# Patient Record
Sex: FEMALE | Race: WHITE | NOT HISPANIC OR LATINO | Employment: STUDENT | URBAN - METROPOLITAN AREA
[De-identification: names, ages, dates, MRNs, and addresses within clinical notes are randomized per-mention and may not be internally consistent; named-entity substitution may affect disease eponyms.]

---
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
## 2023-01-19 DIAGNOSIS — Z30.011 ENCOUNTER FOR INITIAL PRESCRIPTION OF CONTRACEPTIVE PILLS: ICD-10-CM

## 2023-01-19 RX ORDER — DROSPIRENONE AND ETHINYL ESTRADIOL 0.02-3(28)
1 KIT ORAL DAILY
Qty: 84 TABLET | Refills: 3 | Status: SHIPPED | OUTPATIENT
Start: 2023-01-19 | End: 2023-03-23 | Stop reason: ALTCHOICE

## 2023-02-11 ENCOUNTER — HEALTH MAINTENANCE LETTER (OUTPATIENT)
Age: 18
End: 2023-02-11

## 2023-03-23 ENCOUNTER — OFFICE VISIT (OUTPATIENT)
Dept: FAMILY MEDICINE | Facility: CLINIC | Age: 18
End: 2023-03-23

## 2023-03-23 VITALS
OXYGEN SATURATION: 98 % | HEART RATE: 86 BPM | SYSTOLIC BLOOD PRESSURE: 114 MMHG | DIASTOLIC BLOOD PRESSURE: 76 MMHG | WEIGHT: 170 LBS | BODY MASS INDEX: 26.04 KG/M2

## 2023-03-23 DIAGNOSIS — Z30.41 ENCOUNTER FOR SURVEILLANCE OF CONTRACEPTIVE PILLS: ICD-10-CM

## 2023-03-23 DIAGNOSIS — F90.9 ATTENTION DEFICIT HYPERACTIVITY DISORDER (ADHD), UNSPECIFIED ADHD TYPE: ICD-10-CM

## 2023-03-23 DIAGNOSIS — M54.50 ACUTE BILATERAL LOW BACK PAIN WITHOUT SCIATICA: Primary | ICD-10-CM

## 2023-03-23 DIAGNOSIS — Z79.899 CONTROLLED SUBSTANCE AGREEMENT SIGNED: ICD-10-CM

## 2023-03-23 PROCEDURE — 99214 OFFICE O/P EST MOD 30 MIN: CPT | Performed by: NURSE PRACTITIONER

## 2023-03-23 RX ORDER — DEXTROAMPHETAMINE SACCHARATE, AMPHETAMINE ASPARTATE MONOHYDRATE, DEXTROAMPHETAMINE SULFATE AND AMPHETAMINE SULFATE 7.5; 7.5; 7.5; 7.5 MG/1; MG/1; MG/1; MG/1
30 CAPSULE, EXTENDED RELEASE ORAL DAILY
Qty: 30 CAPSULE | Refills: 0 | Status: SHIPPED | OUTPATIENT
Start: 2023-05-24 | End: 2023-06-23

## 2023-03-23 RX ORDER — NORETHINDRONE ACETATE AND ETHINYL ESTRADIOL 1MG-20(21)
1 KIT ORAL DAILY
Qty: 84 TABLET | Refills: 4 | Status: SHIPPED | OUTPATIENT
Start: 2023-03-23 | End: 2024-06-06

## 2023-03-23 RX ORDER — DEXTROAMPHETAMINE SACCHARATE, AMPHETAMINE ASPARTATE MONOHYDRATE, DEXTROAMPHETAMINE SULFATE AND AMPHETAMINE SULFATE 7.5; 7.5; 7.5; 7.5 MG/1; MG/1; MG/1; MG/1
30 CAPSULE, EXTENDED RELEASE ORAL DAILY
Qty: 30 CAPSULE | Refills: 0 | Status: SHIPPED | OUTPATIENT
Start: 2023-04-23 | End: 2023-04-24

## 2023-03-23 RX ORDER — DEXTROAMPHETAMINE SACCHARATE, AMPHETAMINE ASPARTATE MONOHYDRATE, DEXTROAMPHETAMINE SULFATE AND AMPHETAMINE SULFATE 7.5; 7.5; 7.5; 7.5 MG/1; MG/1; MG/1; MG/1
30 CAPSULE, EXTENDED RELEASE ORAL DAILY
COMMUNITY
End: 2023-04-24

## 2023-03-23 RX ORDER — DEXTROAMPHETAMINE SACCHARATE, AMPHETAMINE ASPARTATE MONOHYDRATE, DEXTROAMPHETAMINE SULFATE AND AMPHETAMINE SULFATE 7.5; 7.5; 7.5; 7.5 MG/1; MG/1; MG/1; MG/1
30 CAPSULE, EXTENDED RELEASE ORAL DAILY
Qty: 30 CAPSULE | Refills: 0 | Status: SHIPPED | OUTPATIENT
Start: 2023-03-23 | End: 2023-04-22

## 2023-03-23 NOTE — LETTER
[facility]  03/23/23  Patient: Imelda Rodarte  YOB: 2005  Medical Record Number: [id number]                                                                                  Non-Opioid Controlled Substance Agreement    This is an agreement between you and your provider regarding safe and appropriate use of controlled substances prescribed by your care team. Controlled substances are?medicines that can cause physical and mental dependence (abuse).     There are strict laws about having and using these medicines. We here at [facility] are  committed to working with you in your efforts to get better. To support you in this work, we'll help you schedule regular office appointments for medicine refills. If we must cancel or change your appointment for any reason, we'll make sure you have enough medicine to last until your next appointment.     As a Provider, I will:     Listen carefully to your concerns while treating you with respect.     Recommend a treatment plan that I believe is in your best interest and may involve therapies other than medicine.      Talk with you often about the possible benefits and the risk of harm of any medicine that we prescribe for you.    Assess the safety of this medicine and check how well it works.      Provide a plan on how to taper (discontinue or go off) using this medicine if the decision is made to stop its use.      ::  As a Patient, I understand controlled substances:       Are prescribed by my care provider to help me function or work and manage my condition(s).?    Are strong medicines and can cause serious side effects.       Need to be taken exactly as prescribed.?Combining controlled substances with certain medicines or chemicals (such as illegal drugs, alcohol, sedatives, sleeping pills, and benzodiazepines) can be dangerous or even fatal.? If I stop taking my medicines suddenly, I may have severe withdrawal symptoms.     The risks,  benefits, and side effects of these medicine(s) were explained to me. I agree that:    1. I will take part in other treatments as advised by my care team. This may be psychiatry or counseling, physical therapy, behavioral therapy, group treatment or a referral to specialist.    2. I will keep all my appointments and understand this is part of the monitoring of controlled substances.?My care team may require an office visit for EVERY controlled substance refill. If I miss appointments or don t follow instructions, my care team may stop my medicine    3. I will take my medicines as prescribed. I will not change the dose or schedule unless my care team tells me to. There will be no refills if I run out early.      4. I may be asked to come to the clinic and complete a urine drug test or complete a pill count. If I don t give a urine sample or participate in a pill count, the care team may stop my medicine.    5. I will only receive controlled substance prescriptions from this clinic. If I am treated by another provider, I will tell them that I am taking controlled substances and that I have a treatment agreement with this provider. I will inform my [facility] care team within one business day if I am given a prescription for any controlled substance by another healthcare provider. My [facility] care team can contact other providers and pharmacists about my use of any medicines.    6. It is up to me to make sure that I don't run out of my medicines on weekends or holidays.?If my care team is willing to refill my prescription without a visit, I must request refills only during office hours. Refills may take up to 3 business days to process. I will use one pharmacy to fill all my controlled substance prescriptions. I will notify the clinic about any changes to my insurance or medicine availability.    7. I am responsible for my prescriptions. If the medicine/prescription is lost, stolen or destroyed, it will  not be replaced.?I also agree not to share controlled substance medicines with anyone.     8. I am aware I should not use any illegal or recreational drugs. I agree not to drink alcohol unless my care team says I can.     9. If I enroll in the [place] Medical Cannabis program, I will tell my care team before my next refill.    10. I will tell my care team right away if I become pregnant, have a new medical problem treated outside of my regular clinic, or have a change in my medicines.     11. I understand that this medicine can affect my thinking, judgment and reaction time.? Alcohol and drugs affect the brain and body, which can affect the safety of my driving. Being under the influence of alcohol or drugs can affect my decision-making, behaviors, personal safety and the safety of others. Driving while impaired (DWI) can occur if a person is driving, operating or in physical control of a car, motorcycle, boat, snowmobile, ATV, motorbike, off-road vehicle or any other motor vehicle ([place] Statute 169A.20). I understand the risk if I choose to drive or operate any vehicle or machinery.    I understand that if I do not follow any of the conditions above, my prescriptions or treatment may be stopped or changed.   I agree that my provider, clinic care team and pharmacy may work with any city, state or federal law enforcement agency that investigates the misuse, sale or other diversion of my controlled medicine. I will allow my provider to discuss my care with, or share a copy of, this agreement with any other treating provider, pharmacy or emergency room where I receive care.     I have read this agreement and have asked questions about anything I did not understand.    ________________________________________________________  Patient Signature - Imelda Rodarte     ___________________                   Date     ________________________________________________________  Provider Signature - DERREK Thomas CNP        ___________________                   Date     ________________________________________________________  Witness Signature (required if provider not present while patient signing)          ___________________                   Date

## 2023-03-23 NOTE — PROGRESS NOTES
Problem(s) Oriented visit        SUBJECTIVE:                                                    Imelda Rodarte is a 17 year old female who presents to clinic today for the following health issues :    Back pain - bilateral low back pain for past 1.5-2 months. No known trauma or injury. Radiates up to mid back to just below shoulders. Back feels tight. No Ibuprofen or Tylenol tried. Time helps.     Getting bad cramps with periods. Was doing well without cramps on birth control but cramps started getting back 4-5 months ago. First day of period bleeding heavy.     ADHD - Taking Adderall 30 mg extended release once daily. Feels like this is helping more than immediate release twice daily that she was using before. No appetite suppression or sleep problems. 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 Occasional alcohol use. No smoking or drug use.     Problem list, Medication list, Allergies, and Medical/Social/Surgical histories reviewed in [facility] and updated as appropriate.   Additional history: as documented    ROS:  7 point ROS completed and negative except noted above, including Gen, CV, Resp, , MS, Neuro, Psych    OBJECTIVE:                                                    /76   Pulse 86   Wt 77.1 kg (170 lb)   SpO2 98%   BMI 26.04 kg/m    Body mass index is 26.04 kg/m .   GENERAL: healthy, alert and no distress  RESP: lungs clear to auscultation - no rales, rhonchi or wheezes  CV: regular rates and rhythm, normal S1 S2, no S3 or S4 and no murmur, click or rub  MS: no paraspinal tenderness. Normal range of motion  SKIN: no suspicious lesions or rashes  NEURO: Normal strength and tone, sensory exam grossly normal and mentation intact  PSYCH: normal affect & mood     ASSESSMENT/PLAN:                                                      Imelda was seen today for refill request and back pain.    Diagnoses and all orders for this visit:    Acute bilateral low back pain without  sciatica  Discussed typical mechanical back pain, typical causes, and atypical back pain, including red flag symptoms.  Discussed conservative treatments inclduing physical therpy, stretching and strengthening, use of heat and/or ice, NSAIDs with food. Follow-up with new/worsening symptoms    Attention deficit hyperactivity disorder (ADHD), unspecified ADHD type  Controlled substance agreement signed  -     amphetamine-dextroamphetamine (ADDERALL XR) 30 MG 24 hr capsule; Take 1 capsule (30 mg) by mouth daily for 30 days  -     amphetamine-dextroamphetamine (ADDERALL XR) 30 MG 24 hr capsule; Take 1 capsule (30 mg) by mouth daily for 30 days  -     amphetamine-dextroamphetamine (ADDERALL XR) 30 MG 24 hr capsule; Take 1 capsule (30 mg) by mouth daily for 30 days  Doing well with current medication. Has not had difficulty getting this at her pharmacy. Follow-up visits every 6 months.  PDMP Review       Value Time User    State PDMP site checked  Yes 3/23/2023  3:45 PM Mari Xie APRN CNP        Encounter for surveillance of contraceptive pills  -     norethindrone-ethinyl estradiol (JUNEL FE 1/20) 1-20 MG-MCG tablet; Take 1 tablet by mouth daily  Switching type of oral contraceptive to see if this helps with cramping. Reviewed side effects of medications, alarm signs and symptoms, and when to seek further care.       See Patient Instructions  Patient Instructions   Finish current pack of birth control then start new one sent to pharmacy. Give it 2-3 months to see how this affects you.    Adderall sent to pharmacy. Request refill when 3 months runs out. Next appointment in 6 months.     Back pain - core exercises, stretching, heat, Ibuprofen or Tylenol as needed      DERREK Thomas CNP  [facility]  Family Practice  [facility]  [phone]    For any issues my office # is [phone]

## 2023-03-23 NOTE — PATIENT INSTRUCTIONS
Finish current pack of birth control then start new one sent to pharmacy. Give it 2-3 months to see how this affects you.    Adderall sent to pharmacy. Request refill when 3 months runs out. Next appointment in 6 months.     Back pain - core exercises, stretching, heat, Ibuprofen or Tylenol as needed

## 2023-04-18 ENCOUNTER — MYC MEDICAL ADVICE (OUTPATIENT)
Dept: FAMILY MEDICINE | Facility: CLINIC | Age: 18
End: 2023-04-18

## 2023-04-18 DIAGNOSIS — F90.9 ATTENTION DEFICIT HYPERACTIVITY DISORDER (ADHD), UNSPECIFIED ADHD TYPE: Primary | ICD-10-CM

## 2023-04-24 RX ORDER — DEXTROAMPHETAMINE SACCHARATE, AMPHETAMINE ASPARTATE MONOHYDRATE, DEXTROAMPHETAMINE SULFATE AND AMPHETAMINE SULFATE 7.5; 7.5; 7.5; 7.5 MG/1; MG/1; MG/1; MG/1
30 CAPSULE, EXTENDED RELEASE ORAL DAILY
Qty: 30 CAPSULE | Refills: 0 | Status: SHIPPED | OUTPATIENT
Start: 2023-04-24 | End: 2023-06-07

## 2023-04-24 NOTE — TELEPHONE ENCOUNTER
Patient is requesting a pharmacy transfer of her 4/23/23 Adderall XR 30mg #30 rx.   Patient reports [facility] in [place] does not have it in stock but did find [facility] [address] in [place] did have it in stock and asks for rx to be sent here.       Last related visit: 3/23/23 med check with Mari Xie CNP - 3 months of Adderall XR rx's were sent then.     Controlled Substance Agreement: 3/23/23    [place] :          plan: routed to Mari Xie CNP for review.  Jamee Ramires RN

## 2023-06-05 ENCOUNTER — OFFICE VISIT (OUTPATIENT)
Dept: URGENT CARE | Facility: URGENT CARE | Age: 18
End: 2023-06-05
Payer: COMMERCIAL

## 2023-06-05 VITALS
TEMPERATURE: 99.1 F | SYSTOLIC BLOOD PRESSURE: 132 MMHG | HEART RATE: 83 BPM | DIASTOLIC BLOOD PRESSURE: 79 MMHG | BODY MASS INDEX: 26.81 KG/M2 | OXYGEN SATURATION: 100 % | WEIGHT: 175 LBS

## 2023-06-05 DIAGNOSIS — H53.142: ICD-10-CM

## 2023-06-05 DIAGNOSIS — H57.89 REDNESS OF EYE, LEFT: ICD-10-CM

## 2023-06-05 DIAGNOSIS — H57.12 LEFT EYE PAIN: Primary | ICD-10-CM

## 2023-06-05 PROCEDURE — 99213 OFFICE O/P EST LOW 20 MIN: CPT | Performed by: PHYSICIAN ASSISTANT

## 2023-06-05 RX ORDER — OFLOXACIN 3 MG/ML
2 SOLUTION/ DROPS OPHTHALMIC
Qty: 5 ML | Refills: 0 | Status: SHIPPED | OUTPATIENT
Start: 2023-06-05 | End: 2023-06-12

## 2023-06-05 ASSESSMENT — PAIN SCALES - GENERAL: PAINLEVEL: SEVERE PAIN (6)

## 2023-06-05 NOTE — PROGRESS NOTES
Assessment & Plan     Left eye pain    Warm moist compresses  Motrin for inflammation  Start on ocuflox eye drops as prescribed  If eye pain and light sensitivity are still present tomorrow then advise being seen by ophthalmology tomorrow    - ofloxacin (OCUFLOX) 0.3 % ophthalmic solution; Place 2 drops Into the left eye every 2 hours (while awake) for 7 days  - Adult Eye  Referral; Future    Redness of eye, left    Start on eye drops  Monitor symptoms closely as discussed    - ofloxacin (OCUFLOX) 0.3 % ophthalmic solution; Place 2 drops Into the left eye every 2 hours (while awake) for 7 days  - Adult Eye  Referral; Future    Eyes sensitive to light, left    Start on eye drops  Follow up with Ophthalmology tomorrow  Referral placed on emergent basis due to eye pain, redness and light sensitivity  - ofloxacin (OCUFLOX) 0.3 % ophthalmic solution; Place 2 drops Into the left eye every 2 hours (while awake) for 7 days  - Adult Eye  Referral; Future    Review of external notes as documented elsewhere in note     PROCEDURE  Left eye flourescein stain exam in NEG  Patient tolerated procedure well    CONSULTATION/REFERRAL to Ophthalmology    No follow-ups on file.    Bryan Talamantes, [facility], PA-C  [facility] NNEKA Segura is a 17 year old, presenting for the following health issues:  Eye Problem (Left eye pain since this morning )         View : No data to display.              HPI   Review of Systems   Constitutional, HEENT, cardiovascular, pulmonary, gi and gu systems are negative, except as otherwise noted.      Objective    /79   Pulse 83   Temp 99.1  F (37.3  C) (Tympanic)   Wt 79.4 kg (175 lb)   SpO2 100%   BMI 26.81 kg/m    Body mass index is 26.81 kg/m .  Physical Exam   GENERAL: healthy, alert and no distress  EYES: PERRL, EOMI and conjunctiva/corneas- conjunctival injection OS  HENT: ear canals and TM's normal, nose and mouth without ulcers or  lesions  NECK: no adenopathy, no asymmetry, masses, or scars and thyroid normal to palpation  RESP: lungs clear to auscultation - no rales, rhonchi or wheezes  CV: regular rate and rhythm, normal S1 S2, no S3 or S4, no murmur, click or rub, no peripheral edema and peripheral pulses strong  MS: no gross musculoskeletal defects noted, no edema  SKIN: no suspicious lesions or rashes  NEURO: Normal strength and tone, mentation intact and speech normal  PSYCH: mentation appears normal, affect normal/bright

## 2023-06-07 ENCOUNTER — MYC REFILL (OUTPATIENT)
Dept: FAMILY MEDICINE | Facility: CLINIC | Age: 18
End: 2023-06-07

## 2023-06-07 DIAGNOSIS — F90.9 ATTENTION DEFICIT HYPERACTIVITY DISORDER (ADHD), UNSPECIFIED ADHD TYPE: ICD-10-CM

## 2023-06-08 RX ORDER — DEXTROAMPHETAMINE SACCHARATE, AMPHETAMINE ASPARTATE MONOHYDRATE, DEXTROAMPHETAMINE SULFATE AND AMPHETAMINE SULFATE 7.5; 7.5; 7.5; 7.5 MG/1; MG/1; MG/1; MG/1
30 CAPSULE, EXTENDED RELEASE ORAL DAILY
Qty: 30 CAPSULE | Refills: 0 | Status: SHIPPED | OUTPATIENT
Start: 2023-06-08 | End: 2023-06-13

## 2023-06-08 NOTE — CONFIDENTIAL NOTE
CONTROLLED SUBSTANCE REFILL REQUEST FOR ADDERALL  DOSE: 30 MG - # 30  SI CAP DAILY      LAST REFILL: 23    LAST APPT RELATED: 3/23/23  APPTS EVERY 6 MONTHS    NEXT APPT: NONE      CONTROLLED SUBSTANCE AGREEMENT: 3/23/23    URINE DRUG SCREEN: DUE        FILL HISTORY PER :          REFILL ROUTED TO SHREE SWAIN FOR REVIEW    Xiomara Galindo, [facility]

## 2023-06-13 RX ORDER — DEXTROAMPHETAMINE SACCHARATE, AMPHETAMINE ASPARTATE MONOHYDRATE, DEXTROAMPHETAMINE SULFATE AND AMPHETAMINE SULFATE 7.5; 7.5; 7.5; 7.5 MG/1; MG/1; MG/1; MG/1
30 CAPSULE, EXTENDED RELEASE ORAL DAILY
Qty: 30 CAPSULE | Refills: 0 | Status: SHIPPED | OUTPATIENT
Start: 2023-06-13 | End: 2023-08-10

## 2023-06-13 NOTE — TELEPHONE ENCOUNTER
Prescription unavailable at [facility]. Spoke with [facility] pharmacy who confirmed they have medication in stock. Prescription sent to on call provider Dr Sifuentes for review. Laisha Mullen

## 2023-08-10 ENCOUNTER — MYC REFILL (OUTPATIENT)
Dept: FAMILY MEDICINE | Facility: CLINIC | Age: 18
End: 2023-08-10

## 2023-08-10 DIAGNOSIS — F90.9 ATTENTION DEFICIT HYPERACTIVITY DISORDER (ADHD), UNSPECIFIED ADHD TYPE: ICD-10-CM

## 2023-08-11 RX ORDER — DEXTROAMPHETAMINE SACCHARATE, AMPHETAMINE ASPARTATE MONOHYDRATE, DEXTROAMPHETAMINE SULFATE AND AMPHETAMINE SULFATE 7.5; 7.5; 7.5; 7.5 MG/1; MG/1; MG/1; MG/1
30 CAPSULE, EXTENDED RELEASE ORAL DAILY
Qty: 30 CAPSULE | Refills: 0 | Status: SHIPPED | OUTPATIENT
Start: 2023-08-11 | End: 2023-10-20

## 2023-08-11 NOTE — TELEPHONE ENCOUNTER
Controlled Substance Refill Request for: Adderall XR 30mg si cap every day #30  Last refill: 23 picked up 6/15/23  Last clinic visit: 3/23/23 with Carolee Xie   Clinic visit frequency required: Q 6  months  Next appt: none- due to est care and med ck around 23  Controlled substance agreement on file: Yes:  Date 3/23/23.. tox screen done- 21    [place]  checked, fill history below. Refill routed to Senait Lance for review.     Kim Alberto CMA on 2023 at 3:14 PM

## 2023-08-11 NOTE — CONFIDENTIAL NOTE
CONTROLLED SUBSTANCE REFILL REQUEST FOR ADDERALL  DOSE: 30 MG - # 30  SI CAP DAILY      LAST REFILL: 23    LAST APPT RELATED: 3/23/23  *DUE NEXT MONTH FOR 6 MONTH MED CHECK    NEXT APPT: NONE      CONTROLLED SUBSTANCE AGREEMENT: 3/23/23    URINE DRUG SCREEN: DUE        FILL HISTORY PER :          REFILL ROUTED TO SHREE SWAIN FOR REVIEW    Xiomara Galindo, [facility]

## 2023-10-07 ENCOUNTER — MYC REFILL (OUTPATIENT)
Dept: FAMILY MEDICINE | Facility: CLINIC | Age: 18
End: 2023-10-07

## 2023-10-07 DIAGNOSIS — F90.9 ATTENTION DEFICIT HYPERACTIVITY DISORDER (ADHD), UNSPECIFIED ADHD TYPE: ICD-10-CM

## 2023-10-07 RX ORDER — DEXTROAMPHETAMINE SACCHARATE, AMPHETAMINE ASPARTATE MONOHYDRATE, DEXTROAMPHETAMINE SULFATE AND AMPHETAMINE SULFATE 7.5; 7.5; 7.5; 7.5 MG/1; MG/1; MG/1; MG/1
30 CAPSULE, EXTENDED RELEASE ORAL DAILY
Qty: 30 CAPSULE | Refills: 0 | Status: CANCELLED | OUTPATIENT
Start: 2023-10-07

## 2023-10-09 NOTE — TELEPHONE ENCOUNTER
Patient requesting refill of Adderall XR 30mg QD.   Last related visit: 3/23/23 with Mari Xie CNP   Due for med check 9/2023. Scheduled for 10/12/23 with Senait Lance NP but requests refill       [place] :        Messaged patient to schedule med check.   Patient messaged back that she has scheduled to see Senait Lance NP 10/12 AM.   Will let them discuss meds and refills then.  Jamee Ramires RN

## 2023-10-12 ENCOUNTER — OFFICE VISIT (OUTPATIENT)
Dept: FAMILY MEDICINE | Facility: CLINIC | Age: 18
End: 2023-10-12

## 2023-10-12 VITALS
BODY MASS INDEX: 26.35 KG/M2 | HEART RATE: 68 BPM | OXYGEN SATURATION: 98 % | DIASTOLIC BLOOD PRESSURE: 56 MMHG | SYSTOLIC BLOOD PRESSURE: 100 MMHG | WEIGHT: 172 LBS

## 2023-10-12 DIAGNOSIS — F41.8 SITUATIONAL ANXIETY: ICD-10-CM

## 2023-10-12 DIAGNOSIS — F90.9 ATTENTION DEFICIT HYPERACTIVITY DISORDER (ADHD), UNSPECIFIED ADHD TYPE: Primary | ICD-10-CM

## 2023-10-12 LAB
AMPHETAMINES (AMP) UR: NORMAL
BARBITURATES (BAR) UR: NEGATIVE
BENZODIAZEPINES (BZO) UR: NEGATIVE
BUPRENORPHINE (BUP) UR: NEGATIVE
CANNABINOIDS (THC) UR: NEGATIVE
COCAINE (COC) UR: NEGATIVE
MDMA UR: NEGATIVE
METHADONE (MTD) UR: NEGATIVE
METHAMPHETAMINE (METHA) UR: NEGATIVE
MORPH/OPIATES (MOP) UR: NEGATIVE
OXYCODONE (OXYCO) UR: NEGATIVE
PCP UR: NEGATIVE
SPECIMEN VALIDITY INTERNAL QC UR: NORMAL
SPECIMEN VALIDITY TEMPERATURE UR: NORMAL
SPECIMEN VALIDITY UR CREATININE: NORMAL MG/DL
SPECIMEN VALIDITY UR PH: 7
SPECIMEN VALIDITY UR SPECIFIC GRAVITY: 1.02

## 2023-10-12 PROCEDURE — 99213 OFFICE O/P EST LOW 20 MIN: CPT

## 2023-10-12 PROCEDURE — 80306 DRUG TEST PRSMV INSTRMNT: CPT

## 2023-10-12 RX ORDER — DEXTROAMPHETAMINE SACCHARATE, AMPHETAMINE ASPARTATE MONOHYDRATE, DEXTROAMPHETAMINE SULFATE AND AMPHETAMINE SULFATE 7.5; 7.5; 7.5; 7.5 MG/1; MG/1; MG/1; MG/1
30 CAPSULE, EXTENDED RELEASE ORAL DAILY
Qty: 30 CAPSULE | Refills: 0 | Status: SHIPPED | OUTPATIENT
Start: 2023-12-13 | End: 2024-01-12

## 2023-10-12 RX ORDER — DEXTROAMPHETAMINE SACCHARATE, AMPHETAMINE ASPARTATE MONOHYDRATE, DEXTROAMPHETAMINE SULFATE AND AMPHETAMINE SULFATE 7.5; 7.5; 7.5; 7.5 MG/1; MG/1; MG/1; MG/1
30 CAPSULE, EXTENDED RELEASE ORAL DAILY
Qty: 30 CAPSULE | Refills: 0 | Status: SHIPPED | OUTPATIENT
Start: 2023-10-12 | End: 2023-10-20

## 2023-10-12 RX ORDER — ADAPALENE 0.1 G/100G
CREAM TOPICAL
COMMUNITY
Start: 2023-06-27 | End: 2024-06-17

## 2023-10-12 RX ORDER — DEXTROAMPHETAMINE SACCHARATE, AMPHETAMINE ASPARTATE MONOHYDRATE, DEXTROAMPHETAMINE SULFATE AND AMPHETAMINE SULFATE 7.5; 7.5; 7.5; 7.5 MG/1; MG/1; MG/1; MG/1
30 CAPSULE, EXTENDED RELEASE ORAL DAILY
Qty: 30 CAPSULE | Refills: 0 | Status: SHIPPED | OUTPATIENT
Start: 2023-11-12 | End: 2023-10-20

## 2023-10-12 ASSESSMENT — ANXIETY QUESTIONNAIRES
5. BEING SO RESTLESS THAT IT IS HARD TO SIT STILL: NEARLY EVERY DAY
IF YOU CHECKED OFF ANY PROBLEMS ON THIS QUESTIONNAIRE, HOW DIFFICULT HAVE THESE PROBLEMS MADE IT FOR YOU TO DO YOUR WORK, TAKE CARE OF THINGS AT HOME, OR GET ALONG WITH OTHER PEOPLE: VERY DIFFICULT
3. WORRYING TOO MUCH ABOUT DIFFERENT THINGS: MORE THAN HALF THE DAYS
1. FEELING NERVOUS, ANXIOUS, OR ON EDGE: NEARLY EVERY DAY
7. FEELING AFRAID AS IF SOMETHING AWFUL MIGHT HAPPEN: NOT AT ALL
GAD7 TOTAL SCORE: 13
GAD7 TOTAL SCORE: 13
6. BECOMING EASILY ANNOYED OR IRRITABLE: MORE THAN HALF THE DAYS
2. NOT BEING ABLE TO STOP OR CONTROL WORRYING: MORE THAN HALF THE DAYS

## 2023-10-12 ASSESSMENT — PATIENT HEALTH QUESTIONNAIRE - PHQ9
5. POOR APPETITE OR OVEREATING: SEVERAL DAYS
SUM OF ALL RESPONSES TO PHQ QUESTIONS 1-9: 4

## 2023-10-12 NOTE — PATIENT INSTRUCTIONS
What to do when your medication needs to be refilled?     Call your pharmacy directly and request the medication refill.     Notes about prescriptions-  -Every time our providers send a prescription to the pharmacy, a new prescription number is created.     -If it is too soon for the medication(s) to be filled due to insurance, the pharmacy will STORE them on your profile and can be requested to fill at a later date    -If you have automatic refills, you may need to ask the STORED prescription to be filled when the refills run out    -Calling the pharmacy and speaking to the staff is the first step if there are issues using the automated systems    -You can check your current prescription orders and request refills using [employer]    -Most prescriptions  1 year from the date written (some medication orders  sooner like controlled substances)

## 2023-10-12 NOTE — PROGRESS NOTES
Assessment & Plan     Attention deficit hyperactivity disorder (ADHD), unspecified ADHD type  -stable on adderall, no new side effects  -recheck in 6 months - in person or video appropriate     Situational anxiety  -feels stable at this time, will reach out if worsening/persistent, most likely r/t timing of year in school       Prescription drug management         See Patient Instructions    Return in about 6 months (around 4/12/2024) for Follow up.    DERREK Ruiz CNP  [facility]    Mahsa Segura is a 18 year old, presenting for the following health issues:  Recheck Medication (Adderall )    HPI     Has been on adderall xr since 3rd and 4th grade. Eating well, denies insomnia, heart palpitations, dizziness or any other concerns     More stressed at work and is a  that has been stressful, hoping her anxiety levels will decrease after volleyball season. Not interested in medicinal or talk therapy at this time    Review of Systems   Constitutional, HEENT, cardiovascular, pulmonary, gi and gu systems are negative, except as otherwise noted.      Objective    /56   Pulse 68   Wt 78 kg (172 lb)   SpO2 98%   BMI 26.35 kg/m    Body mass index is 26.35 kg/m .  Physical Exam   GENERAL: healthy, alert and no distress  NECK: no adenopathy, no asymmetry, masses, or scars and thyroid normal to palpation  RESP: lungs clear to auscultation - no rales, rhonchi or wheezes  CV: regular rate and rhythm, normal S1 S2, no S3 or S4, no murmur, click or rub, no peripheral edema and peripheral pulses strong  MS: no gross musculoskeletal defects noted, no edema    Results for orders placed or performed in visit on 10/12/23 (from the past 24 hour(s))   UScreen Toxisure (RMG)   Result Value Ref Range    Cannabinoids (THC) UR Negative     Cocaine (MAG) UR Negative     Morphine (MOP) UR Negative     PCP UR Negative     MDMA UR Negative     Amphetamines (AMP) UR Presumptive Positive      Methamphetamine (METHA) UR Negative     Barbiturates (BAR) UR Negative     Benzodiazepines (BZO) UR Negative     Methadone (MTD) UR Negative     Oxycodone (OXYCO) UR Negative     Buprenorphine (BUP) UR Negative     Specimen Validity UR Creatinine 200 mg/dL mg/dL    Specimen Validity UR pH 7.0     Specimen Validity UR Specific Gravity 1.025     Specimen Validity Temperature UR PASS     Specimen Validity Internal QC UR PASS

## 2023-10-12 NOTE — LETTER
Former[facility]  10/12/23  Patient: Imelda Rodarte  YOB: 2005  Medical Record Number: [id number]                                                                                  Non-Opioid Controlled Substance Agreement    This is an agreement between you and your provider regarding safe and appropriate use of controlled substances prescribed by your care team. Controlled substances are?medicines that can cause physical and mental dependence (abuse).     There are strict laws about having and using these medicines. We here at [facility] are  committed to working with you in your efforts to get better. To support you in this work, we'll help you schedule regular office appointments for medicine refills. If we must cancel or change your appointment for any reason, we'll make sure you have enough medicine to last until your next appointment.     As a Provider, I will:   Listen carefully to your concerns while treating you with respect.   Recommend a treatment plan that I believe is in your best interest and may involve therapies other than medicine.    Talk with you often about the possible benefits and the risk of harm of any medicine that we prescribe for you.  Assess the safety of this medicine and check how well it works.    Provide a plan on how to taper (discontinue or go off) using this medicine if the decision is made to stop its use.      ::  As a Patient, I understand controlled substances:     Are prescribed by my care provider to help me function or work and manage my condition(s).?  Are strong medicines and can cause serious side effects.     Need to be taken exactly as prescribed.?Combining controlled substances with certain medicines or chemicals (such as illegal drugs, alcohol, sedatives, sleeping pills, and benzodiazepines) can be dangerous or even fatal.? If I stop taking my medicines suddenly, I may have severe withdrawal symptoms.     The risks, benefits, and side  effects of these medicine(s) were explained to me. I agree that:    I will take part in other treatments as advised by my care team. This may be psychiatry or counseling, physical therapy, behavioral therapy, group treatment or a referral to specialist.    I will keep all my appointments and understand this is part of the monitoring of controlled substances.?My care team may require an office visit for EVERY controlled substance refill. If I miss appointments or don t follow instructions, my care team may stop my medicine    I will take my medicines as prescribed. I will not change the dose or schedule unless my care team tells me to. There will be no refills if I run out early.      I may be asked to come to the clinic and complete a urine drug test or complete a pill count. If I don t give a urine sample or participate in a pill count, the care team may stop my medicine.    I will only receive controlled substance prescriptions from this clinic. If I am treated by another provider, I will tell them that I am taking controlled substances and that I have a treatment agreement with this provider. I will inform my [facility] care team within one business day if I am given a prescription for any controlled substance by another healthcare provider. My [facility] care team can contact other providers and pharmacists about my use of any medicines.    It is up to me to make sure that I don't run out of my medicines on weekends or holidays.?If my care team is willing to refill my prescription without a visit, I must request refills only during office hours. Refills may take up to 3 business days to process. I will use one pharmacy to fill all my controlled substance prescriptions. I will notify the clinic about any changes to my insurance or medicine availability.    I am responsible for my prescriptions. If the medicine/prescription is lost, stolen or destroyed, it will not be replaced.?I also agree not to  share controlled substance medicines with anyone.     I am aware I should not use any illegal or recreational drugs. I agree not to drink alcohol unless my care team says I can.     If I enroll in the [place] Medical Cannabis program, I will tell my care team before my next refill.    I will tell my care team right away if I become pregnant, have a new medical problem treated outside of my regular clinic, or have a change in my medicines.     I understand that this medicine can affect my thinking, judgment and reaction time.? Alcohol and drugs affect the brain and body, which can affect the safety of my driving. Being under the influence of alcohol or drugs can affect my decision-making, behaviors, personal safety and the safety of others. Driving while impaired (DWI) can occur if a person is driving, operating or in physical control of a car, motorcycle, boat, snowmobile, ATV, motorbike, off-road vehicle or any other motor vehicle ([place] Statute 169A.20). I understand the risk if I choose to drive or operate any vehicle or machinery.    I understand that if I do not follow any of the conditions above, my prescriptions or treatment may be stopped or changed.   I agree that my provider, clinic care team and pharmacy may work with any city, state or federal law enforcement agency that investigates the misuse, sale or other diversion of my controlled medicine. I will allow my provider to discuss my care with, or share a copy of, this agreement with any other treating provider, pharmacy or emergency room where I receive care.     I have read this agreement and have asked questions about anything I did not understand.    ________________________________________________________  Patient Signature - Imelda Rodarte     ___________________                   Date     ________________________________________________________  Provider Signature - DERREK Ruiz CNP       ___________________                   Date      ________________________________________________________  Witness Signature (required if provider not present while patient signing)          ___________________                   Date

## 2023-10-20 ENCOUNTER — HOSPITAL ENCOUNTER (EMERGENCY)
Facility: CLINIC | Age: 18
Discharge: HOME | End: 2023-10-20
Attending: STUDENT IN AN ORGANIZED HEALTH CARE EDUCATION/TRAINING PROGRAM | Admitting: STUDENT IN AN ORGANIZED HEALTH CARE EDUCATION/TRAINING PROGRAM
Payer: COMMERCIAL

## 2023-10-20 VITALS
SYSTOLIC BLOOD PRESSURE: 118 MMHG | DIASTOLIC BLOOD PRESSURE: 76 MMHG | OXYGEN SATURATION: 98 % | HEIGHT: 68 IN | TEMPERATURE: 97.9 F | WEIGHT: 173.8 LBS | BODY MASS INDEX: 26.34 KG/M2 | RESPIRATION RATE: 18 BRPM | HEART RATE: 97 BPM

## 2023-10-20 DIAGNOSIS — R55 SYNCOPE AND COLLAPSE: ICD-10-CM

## 2023-10-20 LAB
ALBUMIN SERPL BCG-MCNC: 4.1 G/DL (ref 3.5–5.2)
ALP SERPL-CCNC: 89 U/L (ref 35–104)
ALT SERPL W P-5'-P-CCNC: 13 U/L (ref 0–50)
ANION GAP SERPL CALCULATED.3IONS-SCNC: 10 MMOL/L (ref 7–15)
AST SERPL W P-5'-P-CCNC: 16 U/L (ref 0–45)
ATRIAL RATE - MUSE: 57 BPM
BASO+EOS+MONOS # BLD AUTO: NORMAL 10*3/UL
BASO+EOS+MONOS NFR BLD AUTO: NORMAL %
BASOPHILS # BLD AUTO: 0 10E3/UL (ref 0–0.2)
BASOPHILS NFR BLD AUTO: 0 %
BILIRUB SERPL-MCNC: 1.3 MG/DL
BUN SERPL-MCNC: 11.4 MG/DL (ref 6–20)
CALCIUM SERPL-MCNC: 9 MG/DL (ref 8.6–10)
CHLORIDE SERPL-SCNC: 102 MMOL/L (ref 98–107)
CREAT SERPL-MCNC: 0.94 MG/DL (ref 0.51–0.95)
DEPRECATED HCO3 PLAS-SCNC: 24 MMOL/L (ref 22–29)
DIASTOLIC BLOOD PRESSURE - MUSE: NORMAL MMHG
EGFRCR SERPLBLD CKD-EPI 2021: 89 ML/MIN/1.73M2
EOSINOPHIL # BLD AUTO: 0.2 10E3/UL (ref 0–0.7)
EOSINOPHIL NFR BLD AUTO: 2 %
ERYTHROCYTE [DISTWIDTH] IN BLOOD BY AUTOMATED COUNT: 12.1 % (ref 10–15)
GLUCOSE SERPL-MCNC: 104 MG/DL (ref 70–99)
HCG SERPL QL: NEGATIVE
HCT VFR BLD AUTO: 37.8 % (ref 35–47)
HGB BLD-MCNC: 12.3 G/DL (ref 11.7–15.7)
IMM GRANULOCYTES # BLD: 0 10E3/UL
IMM GRANULOCYTES NFR BLD: 0 %
INTERPRETATION ECG - MUSE: NORMAL
LYMPHOCYTES # BLD AUTO: 1.1 10E3/UL (ref 0.8–5.3)
LYMPHOCYTES NFR BLD AUTO: 14 %
MCH RBC QN AUTO: 29.9 PG (ref 26.5–33)
MCHC RBC AUTO-ENTMCNC: 32.5 G/DL (ref 31.5–36.5)
MCV RBC AUTO: 92 FL (ref 78–100)
MONOCYTES # BLD AUTO: 0.6 10E3/UL (ref 0–1.3)
MONOCYTES NFR BLD AUTO: 8 %
NEUTROPHILS # BLD AUTO: 5.8 10E3/UL (ref 1.6–8.3)
NEUTROPHILS NFR BLD AUTO: 76 %
NRBC # BLD AUTO: 0 10E3/UL
NRBC BLD AUTO-RTO: 0 /100
P AXIS - MUSE: 60 DEGREES
PLATELET # BLD AUTO: 225 10E3/UL (ref 150–450)
POTASSIUM SERPL-SCNC: 4 MMOL/L (ref 3.4–5.3)
PR INTERVAL - MUSE: 116 MS
PROT SERPL-MCNC: 7.3 G/DL (ref 6.4–8.3)
QRS DURATION - MUSE: 94 MS
QT - MUSE: 436 MS
QTC - MUSE: 424 MS
R AXIS - MUSE: 94 DEGREES
RBC # BLD AUTO: 4.12 10E6/UL (ref 3.8–5.2)
SODIUM SERPL-SCNC: 136 MMOL/L (ref 135–145)
SYSTOLIC BLOOD PRESSURE - MUSE: NORMAL MMHG
T AXIS - MUSE: 30 DEGREES
VENTRICULAR RATE- MUSE: 57 BPM
WBC # BLD AUTO: 7.6 10E3/UL (ref 4–11)

## 2023-10-20 PROCEDURE — 84703 CHORIONIC GONADOTROPIN ASSAY: CPT | Performed by: STUDENT IN AN ORGANIZED HEALTH CARE EDUCATION/TRAINING PROGRAM

## 2023-10-20 PROCEDURE — 99284 EMERGENCY DEPT VISIT MOD MDM: CPT | Mod: 25

## 2023-10-20 PROCEDURE — 96361 HYDRATE IV INFUSION ADD-ON: CPT

## 2023-10-20 PROCEDURE — 85025 COMPLETE CBC W/AUTO DIFF WBC: CPT | Performed by: STUDENT IN AN ORGANIZED HEALTH CARE EDUCATION/TRAINING PROGRAM

## 2023-10-20 PROCEDURE — 36415 COLL VENOUS BLD VENIPUNCTURE: CPT | Performed by: STUDENT IN AN ORGANIZED HEALTH CARE EDUCATION/TRAINING PROGRAM

## 2023-10-20 PROCEDURE — 80053 COMPREHEN METABOLIC PANEL: CPT | Performed by: STUDENT IN AN ORGANIZED HEALTH CARE EDUCATION/TRAINING PROGRAM

## 2023-10-20 PROCEDURE — 96374 THER/PROPH/DIAG INJ IV PUSH: CPT

## 2023-10-20 PROCEDURE — 93005 ELECTROCARDIOGRAM TRACING: CPT

## 2023-10-20 PROCEDURE — 250N000011 HC RX IP 250 OP 636: Mod: JZ | Performed by: STUDENT IN AN ORGANIZED HEALTH CARE EDUCATION/TRAINING PROGRAM

## 2023-10-20 PROCEDURE — 250N000013 HC RX MED GY IP 250 OP 250 PS 637: Performed by: STUDENT IN AN ORGANIZED HEALTH CARE EDUCATION/TRAINING PROGRAM

## 2023-10-20 PROCEDURE — 258N000003 HC RX IP 258 OP 636: Performed by: STUDENT IN AN ORGANIZED HEALTH CARE EDUCATION/TRAINING PROGRAM

## 2023-10-20 RX ORDER — KETOROLAC TROMETHAMINE 15 MG/ML
15 INJECTION, SOLUTION INTRAMUSCULAR; INTRAVENOUS ONCE
Status: COMPLETED | OUTPATIENT
Start: 2023-10-20 | End: 2023-10-20

## 2023-10-20 RX ORDER — ACETAMINOPHEN 500 MG
1000 TABLET ORAL ONCE
Status: COMPLETED | OUTPATIENT
Start: 2023-10-20 | End: 2023-10-20

## 2023-10-20 RX ADMIN — ACETAMINOPHEN 1000 MG: 500 TABLET, FILM COATED ORAL at 15:44

## 2023-10-20 RX ADMIN — SODIUM CHLORIDE 1000 ML: 9 INJECTION, SOLUTION INTRAVENOUS at 15:13

## 2023-10-20 RX ADMIN — KETOROLAC TROMETHAMINE 15 MG: 15 INJECTION, SOLUTION INTRAMUSCULAR; INTRAVENOUS at 15:44

## 2023-10-20 RX ADMIN — SODIUM CHLORIDE 1000 ML: 9 INJECTION, SOLUTION INTRAVENOUS at 13:35

## 2023-10-20 ASSESSMENT — ACTIVITIES OF DAILY LIVING (ADL)
ADLS_ACUITY_SCORE: 35

## 2023-10-20 NOTE — ED NOTES
[facility]  ED to EMPATH Checklist:      Goal for EMPATH: Anxiety management    Current Behavior: Calm and Cooperative    Safety Concerns: None    Legal Hold Status: Voluntary    Medically Cleared by ED provider: Yes, Vital signs stable, and IV removed    Patient Therapeutically Searched: N/A    Belongings: Remain with patient    Independent Ambulation at Baseline: Yes/No: Yes    Participates in Care/Conversation: Yes/No: Yes    Patient Informed about EMPATH: Yes/No: Yes    DEC: Not ordered    Patient Ready to be Transferred to EMPATH? Yes/No: Yes

## 2023-10-20 NOTE — ED TRIAGE NOTES
EMS report: at [facility] with mom. Clayton lightheaded/dizzy. Possible LOC (slumped in chair), pale, hypotensive. Initial BP was 60s/40s for EMS. Abd pain generalized, bilateral low back pain, nausea - no vomiting. Feeling ill since yesterday. PIV 20g R AC. EMS administered  mL. Last /39. Patient declined zofran. HR 50s-60s. Denies SOB.

## 2023-10-20 NOTE — ED NOTES
Road tested the patient. Patient was able to ambulated independently without any difficulties or complaints. MD notified.

## 2023-10-20 NOTE — ED PROVIDER NOTES
History     Chief Complaint:  Syncope, Abdominal Pain, and Hypotension       HPI   Imelda Rodarte is a 18 year old female otherwise healthy, brought in by EMS after syncopal episode.  Patient has been feeling unwell since yesterday which she thought was secondary to a cold.  She had sore throat, runny nose, nasal congestion and generalized body aches.  She did not drink much fluid yesterday.  This morning she had limited fluid but did not eat yet.  She was a previous bagels to try to get some food when she felt lightheaded and dizzy and then went out onto her knees and subsequently passed out.  She did not hit her head.  This was witnessed by friend.  No seizure activity.  She was found by EMS to be hypotensive and was transported here.  In the waiting room her blood pressure was in the 60s so she was promptly placed in room, IV fluids were started and labs drawn.  Patient states she is feeling improved.  No headache.  No chest pain or shortness of breath.  She has mild lower abdominal aching but she states is not particularly bothersome.  No focal motor or sensory deficit.  No numbness.  No urinary symptoms.  No difficulty stooling.  No vaginal bleeding.  Denies being pregnant.    No first-degree relative with sudden death at a young age.  Mother states her cousin had a thoracic dissection in his 30s, that was thought secondary to a bad valve.    Patient states she does have some low back pain bilaterally which has been there for a week.  Independent Historian:    none    Review of External Notes:  Office note October 12, 2023 for ADHD and situational anxiety.    Medications:    adapalene (DIFFERIN) 0.1 % external cream  amphetamine-dextroamphetamine (ADDERALL XR) 30 MG 24 hr capsule  [START ON 11/12/2023] amphetamine-dextroamphetamine (ADDERALL XR) 30 MG 24 hr capsule  [START ON 12/13/2023] amphetamine-dextroamphetamine (ADDERALL XR) 30 MG 24 hr capsule  amphetamine-dextroamphetamine (ADDERALL XR) 30 MG 24  hr capsule  norethindrone-ethinyl estradiol (JUNEL FE 1/20) 1-20 MG-MCG tablet        Past Medical History:    Past Medical History:   Diagnosis Date    ADHD        Past Surgical History:    Past Surgical History:   Procedure Laterality Date    CYST REMOVAL      under eye    TONSILLECTOMY            Physical Exam   Patient Vitals for the past 24 hrs:   BP Temp Temp src Pulse Resp SpO2   10/20/23 1545 110/45 -- -- 76 13 --   10/20/23 1530 106/51 -- -- 85 -- --   10/20/23 1515 102/55 -- -- 75 13 --   10/20/23 1500 98/62 -- -- 79 17 --   10/20/23 1450 (!) 80/43 -- -- 81 26 --   10/20/23 1445 (!) 62/41 -- -- 76 12 --   10/20/23 1430 (!) 106/37 -- -- 80 19 --   10/20/23 1415 (!) 81/37 -- -- 72 19 --   10/20/23 1413 -- -- -- 77 12 --   10/20/23 1409 108/84 -- -- -- -- --   10/20/23 1345 106/54 -- -- 95 -- --   10/20/23 1330 97/52 98  F (36.7  C) Oral 74 -- --   10/20/23 1320 (!) 68/49 -- -- -- -- --   10/20/23 1314 94/45 -- -- -- -- --   10/20/23 1311 (!) 52/26 -- -- 51 15 100 %        Physical Exam  GENERAL: Patient well-appearing  HEAD: Atraumatic.  EYES: Anicteric  NOSE: No active bleeding  MOUTH: Moist mucosa  THROAT: Patent airway.   NECK: No rigidity  CV: RRR, no murmurs rubs or gallops  PULM: CTAB with good aeration; no retractions, rales, rhonchi, or wheezing  ABD: Soft, nontender, nondistended, no guarding, no peritoneal signs   DERM: No rash. Skin warm and dry  EXTREMITY: Moving all extremities without difficulty. No calf tenderness or peripheral edema.  Extremities are warm and well-perfused.  VASCULAR: Symmetric pulses bilaterally = 1+ PT pulses bilaterally.  NEURO: A,Ox3. CN 2-12 fully intact. Strength 5/5 bilateral LE/UE. Sensation fully intact to light touch symmetrically bilateral LE/UE.    Back: No midline spinal tenderness.  Reproducible tenderness over bilateral SI joint and glutes, patient winces when palpation of these areas but there are no skin changes.    Emergency Department Course   ECG  ECG  interpreted by me.  Time 1307  Sinus bradycardia rate 57.  Rightward axis.  No ST elevation or depression. Normal intervals. Normal axis.   No evidence of WPW, Brugada, HOCM, ARVD, ASD, or Wellen's.           Laboratory:  Labs Ordered and Resulted from Time of ED Arrival to Time of ED Departure   COMPREHENSIVE METABOLIC PANEL - Abnormal       Result Value    Sodium 136      Potassium 4.0      Carbon Dioxide (CO2) 24      Anion Gap 10      Urea Nitrogen 11.4      Creatinine 0.94      GFR Estimate 89      Calcium 9.0      Chloride 102      Glucose 104 (*)     Alkaline Phosphatase 89      AST 16      ALT 13      Protein Total 7.3      Albumin 4.1      Bilirubin Total 1.3 (*)    HCG QUALITATIVE PREGNANCY - Normal    hCG Serum Qualitative Negative     CBC WITH PLATELETS AND DIFFERENTIAL    WBC Count 7.6      RBC Count 4.12      Hemoglobin 12.3      Hematocrit 37.8      MCV 92      MCH 29.9      MCHC 32.5      RDW 12.1      Platelet Count 225      % Neutrophils 76      % Lymphocytes 14      % Monocytes 8      Mids % (Monos, Eos, Basos)        % Eosinophils 2      % Basophils 0      % Immature Granulocytes 0      NRBCs per 100 WBC 0      Absolute Neutrophils 5.8      Absolute Lymphocytes 1.1      Absolute Monocytes 0.6      Mids Abs (Monos, Eos, Basos)        Absolute Eosinophils 0.2      Absolute Basophils 0.0      Absolute Immature Granulocytes 0.0      Absolute NRBCs 0.0               Emergency Department Course & Assessments:             Interventions:  Medications   sodium chloride 0.9% BOLUS 1,000 mL (0 mLs Intravenous Stopped 10/20/23 1458)   sodium chloride 0.9% BOLUS 1,000 mL (1,000 mLs Intravenous $New Bag 10/20/23 1513)   ketorolac (TORADOL) injection 15 mg (15 mg Intravenous $Given 10/20/23 1544)   acetaminophen (TYLENOL) tablet 1,000 mg (1,000 mg Oral $Given 10/20/23 1544)             Independent Interpretation (X-rays, CTs, rhythm strip):  None    Consultations/Discussion of Management or Tests:  None        Social Determinants of Health affecting care:  none     Disposition:  To EMPATH    Impression & Plan         Medical Decision Making:  Patient presenting after episode of syncope.  Now with return to baseline mental status.      DDx considered, but not limited to cardiogenic syncope, bleed, vascular injury, CVA, however evaluation not consistent with these etiologies.    Vital signs initially showing blood pressure 50s to 60s systolic.  However that was shortly after the syncopal episode.  Patient was brought back into room.  IV fluids were started and her blood pressure returned to normal.  Patient states her baseline blood pressure is 100/55 and that was checked recently at a clinic visit.  So, patient typically runs slightly low.  She was given 2 L IV fluid and she felt much better.  Repeat blood pressures improved.    ECG independently interpreted - unremarkable.     Performed period of observation on cardiac monitor without evidence of arrhythmia or acute deterioration.      CBC CMP unremarkable and hCG is negative.    Patient did have some low back soreness but that has been present for a week.  She was provided Toradol and Tylenol and she felt slightly better.    Her abdominal soreness improved.  She did not have any sharp pain and her abdominal exam was unremarkable.    Due to the low blood pressure initially I did perform a FAST exam which was unremarkable.  I also looked at her aorta which had normal caliber and appearance without any abnormal flaps.    Patient smiling and well-appearing on multiple repeat assessments.  She is able to tolerate p.o.    Patient did mention that she has been having some issues with anxiety and she would like to go to the empath unit.  She does not feel suicidal.  Therefore, we will send patient to the empath unit.      Critical Care time:  was 0 minutes for this patient excluding procedures.    Diagnosis:    ICD-10-CM    1. Syncope and collapse  R55            Discharge  Medications:  New Prescriptions    No medications on file          Homar Baez MD  10/20/2023   Homar Baez MD Foss, Kevin, MD  10/20/23 0265

## 2023-10-20 NOTE — ED NOTES
Pt is brought over to [facility] with increased anxiety. Pt states she was feeling like her anxiety has been experiencing increased sx for the past 2 weeks including more Isostation and increased feeling of being overwhelmed. She reports today she had an event in which she passed out at [employer] but doesn't not remember why. She was evaluated in the ED and medically cleared and overall feels much better. She states she has been eating and drinking well and denies any changes in her diet. Pt denies any sleep difficulties. She does not have any psych providers and is open to meeting with someone to set up appointments. Pt denies any is or hallucinations or substance use. Nursing and risk assessments completed.  Assessments reviewed with [facility] and physician. Video monitoring in progress, patient informed.  Admission information reviewed with patient. Patient given a tour of [facility] and instructions on using the facility. Questions regarding [facility] addressed. Pt search completed and belongings inventoried.

## 2023-10-21 NOTE — ED NOTES
Pt was able to meet with [facility] and talked with how she is looking for resources. Pt denies need any medications and ws not interested in waiting to meet with the providers. ED provider was aware of the situation and pt was discharged with resources. Discharge instructions reviewed with patient including follow-up care plan. Educated on medication regime and advised not to stop prescribed medication without consulting their physician. Reviewed safety plan and outpatient resources/appointments.Verbalized understanding of discharge instructions. Denies SI. All belongings which where brought into the hospital have been returned to patient. Escorted off the unit @ 1935. Discharged to  self.

## 2023-10-21 NOTE — CONSULTS
Diagnostic Evaluation Consultation  Crisis Assessment    Patient Name: Imelda Rodarte  Age:  18 year old  Legal Sex: female  Gender Identity: female  Pronouns: she/her  Race: White  Ethnicity: Not  or   Language: English      Patient was assessed: In person      Patient location: [facility] EMERGENCY DEPT                             [id number]    Referral Data and Chief Complaint  Imelda Rodarte presents to the ED with family/friends, via EMS. Patient is presenting to the ED for the following concerns: Health stressors.   Factors that make the mental health crisis life threatening or complex are:  Pt presented to the ED today via EMS due to having a fainting episode while at [facility] with a friend. When EMS arrived, Pt's blood pressure was very low so she was transported to the ED. While in the ED with her mom, Pt shared history of anxiety as well as desire to be connected with therapy resources. Pt denies having current mental health crisis and does not want to wait to be seen by the provider. Pt denies any current SI, SIB, HI, AH, VH, grandiose or delusional thoughts, or substance abuse concerns. Pt does endorse history of disordered eating habits, noting she will go through periods of time of either binging or restricting. Pt reports over the past few weeks, she has been eating well, noting she does not believe this contributed to fainting today. Pt reports past diagnoses of ADHD and Anxiety, noting she currently takes Adderall for ADHD.      Informed Consent and Assessment Methods  Explained the crisis assessment process, including applicable information disclosures and limits to confidentiality, assessed understanding of the process, and obtained consent to proceed with the assessment.  Assessment methods included conducting a formal interview with patient, review of medical records, collaboration with medical staff, and obtaining relevant collateral information from family  and community providers when available.       Patient response to interventions: acceptance expressed, verbalizes understanding  Coping skills were attempted to reduce the crisis:  walking dog, talking with friend     History of the Crisis   Pt reports she has been diagnosed with ADHD around age 5 and currently is prescribed 30mg of Adderall. Pt reports she was diagnosed with Anxiety about 2 years ago. At that time, she was prescribed medications, but stopped taking it after a short period of time. Pt denies any history inpatient hospitalizations, intensive outpatient resources or history of imminent safety concerns. Pt reports she did participate in play therpy about 10 years ago, but has not worked with a therapist since.    Brief Psychosocial History  Family:  Single, Children no  Support System:  Parent(s) (best friend)  Employment Status:  employed full-time  Source of Income:  salary/wages  Financial Environmental Concerns:  none  Current Hobbies:  interaction with pets, exercise/fitness (walking)  Barriers in Personal Life:   none    Significant Clinical History  Current Anxiety Symptoms:  anxious  Current Depression/Trauma:  irritable  Current Somatic Symptoms:   none  Current Psychosis/Thought Disturbance:     Current Eating Symptoms:   Pt reports patterns of restricting and binging; has been stable for past few weeks  Chemical Use History:  Alcohol: None  Benzodiazepines: None  Opiates: None  Cocaine: None  Marijuana: None  Other Use: None   Past diagnosis:  ADHD, Anxiety Disorder  Family history:  ADHD, Anxiety Disorder, Substance Use Disorder, Death by Suicide (Great maternal aunt completed suicide)  Past treatment:  Individual therapy  Details of most recent treatment:  Play therapy 10+ years ago  Other relevant history:  Pt reports experiencing trauma throughout childhood due to 'growing up around a lot of addicts.' Pt also shares she has no relationship with her dad and has not had a relationship  throughout most of her life.       Collateral Information  Is there collateral information: Yes     Collateral information name, relationship, phone number:  YOSELYN JONES (Mother) [phone]    What happened today: She was with her friend this morning and fainted. She was brought to the ED to get checked out medically. While here waiting for several hours, she mentioned she's struggled with anxiety in the past and would like help getting a therapist.     What is different about patient's functioning: She's been having a little more anxiety recently. There's been a couple situations in which she almost had a panic attack but was able to work through it. Seems related to big crowds or social settings     Concern about alcohol/drug use:  No    What do you think the patient needs: she just wants to get scheduled with a therapist      Has patient made comments about wanting to kill themselves/others: no    If d/c is recommended, can they take part in safety/aftercare planning:  yes    Additional collateral information:  There is significant family history of substance abuse and anxiety throughout both sides of her family. Both my brother and her dad's brother  in their 40s due to alcoholism. She does not currently have a relationship with her dad     Risk Assessment  [place] Suicide Severity Rating Scale Full Clinical Version:  Suicidal Ideation  Q1 Wish to be Dead (Lifetime): No  Q2 Non-Specific Active Suicidal Thoughts (Lifetime): No     Suicidal Behavior (Lifetime)  Actual Attempt (Lifetime): No  Has subject engaged in non-suicidal self-injurious behavior? (Lifetime): Yes  Interrupted Attempts (Lifetime): No  Aborted or Self-Interrupted Attempt (Lifetime): No  Preparatory Acts or Behavior (Lifetime): No    [place] Suicide Severity Rating Scale Recent:   Suicidal Ideation (Recent)  Q1 Wished to be Dead (Past Month): no  Q2 Suicidal Thoughts (Past Month): no  Intensity of Ideation (Recent)  Frequency (Past  1 Month):  (NA)  Duration (Past 1 Month):  (NA)  Controllability (Past 1 Month):  (NA)  Deterrents (Past 1 Month):  (NA)  Reasons for Ideation (Past 1 Month):  (NA)  Suicidal Behavior (Recent)  Actual Attempt (Past 3 Months): No  Has subject engaged in non-suicidal self-injurious behavior? (Past 3 Months): No  Interrupted Attempts (Past 3 Months): No  Aborted or Self-Interrupted Attempt Description (Past 3 Months): NA  Preparatory Acts or Behavior (Past 3 Months): No  Preparatory Acts or Behavior Description (Past 3 Months): NA    Environmental or Psychosocial Events: other (see comment) (denies)  Protective Factors: Protective Factors: strong bond to family unit, community support, or employment, responsibilities and duties to others, including pets and children, lives in a responsibly safe and stable environment, able to access care without barriers, good problem-solving, coping, and conflict resolution skills, help seeking, sense of self-efficacy and/or positive self-esteem, optimistic outlook - identification of future goals, constructive use of leisure time, enjoyable activities, resilience    Does the patient have thoughts of harming others? Feels Like Hurting Others: no  Previous Attempt to Hurt Others: no  Is the patient engaging in sexually inappropriate behavior?: no    Is the patient engaging in sexually inappropriate behavior?  no        Mental Status Exam   Affect: Appropriate  Appearance: Appropriate  Attention Span/Concentration: Attentive  Eye Contact: Variable    Fund of Knowledge: Appropriate   Language /Speech Content: Fluent  Language /Speech Volume: Normal  Language /Speech Rate/Productions: Normal  Recent Memory: Intact  Remote Memory: Intact  Mood: Anxious  Orientation to Person: Yes   Orientation to Place: Yes  Orientation to Time of Day: Yes  Orientation to Date: Yes     Situation (Do they understand why they are here?): Yes  Psychomotor Behavior: Normal  Thought Content: Clear  Thought  Form: Intact        Medication  Psychotropic medications:   Medication Orders - Psychiatric (From admission, onward)      None             Current Care Team  Patient Care Team:  Senait Lance APRN CNP as PCP - General (Nurse Practitioner Primary Care)  Mari Xie APRN CNP as Assigned PCP    Diagnosis  Patient Active Problem List   Diagnosis Code    Attention deficit hyperactivity disorder (ADHD), unspecified ADHD type F90.9    Controlled substance agreement signed Z79.899    Anxiety F41.9       Primary Problem This Admission  Active Hospital Problems    Anxiety      Attention deficit hyperactivity disorder (ADHD), unspecified ADHD type        Clinical Summary and Substantiation of Recommendations   Pt presented to the ED for a medical concern and expressed desire to get help accessing therapy resources. Pt declined to wait to be seen by our provider and expressed desire to leave [facility]. Pt was scheduled with an outpatient therapist for Tue 10/24/23 at 5pm. Pt denied any imminent safety concerns and appears safe to discharge home at this time.      Patient coping skills attempted to reduce the crisis:  walking dog, talking with friend    Disposition  Recommended disposition: Individual Therapy        Reviewed case and recommendations with attending provider. Attending Name: Dr. Bolton       Attending concurs with disposition: yes       Patient and/or validated legal guardian concurs with disposition:   yes       Final disposition:  discharge    Legal status on admission: Voluntary/Patient has signed consent for treatment    Assessment Details   Total duration spent with the patient: 31 min     CPT code(s) utilized: 45077 - Psychotherapy for Crisis - 60 (30-74*) min    JAIRO PEARL, Psychotherapist  [facility] - Triage & Transition Services  Callback: [phone]

## 2023-10-21 NOTE — DISCHARGE INSTRUCTIONS
Aftercare Plan    Follow up with established providers and supports as scheduled. Continue taking medications as prescribed. Abstain from drugs and alcohol. [facility] crisis team as needed. They are available 24/7. Contact information is listed below.     The following appointment(s) and/or referral(s) were made on your behalf. If you need to make changes or cancel please contact the clinic/provider directly.     What: Therapy - Initial (In-Person)  When:  Tuesday, 10/24/2023  Time: 5:00 pm - 5:55 pm  Who: Jane Araya MA  [facility]  Where: [facility], [address] [place] [address], [address], [place] [address]  Contact: [phone]    Remember: give the referrals 3 sessions prior to calling it quits. Do you trust them? Do you feel understood? Do you think they can help? Check in with yourself after each session    If I am feeling unsafe or I am in a crisis, I will:   Contact my established care providers   Call the National Suicide Prevention Lifeline: 988  Go to the nearest emergency room   Call 911     Warning signs that I or other people might notice when a crisis is developing for me: changes to sleep, appetite or mood, increased anger, agitation or irritability, feeling depressed or hopeless, spending more time alone or talking less, increased crying, decreased productivity, seeing or hearing things that aren't there, thoughts of not wanting to live anymore or of actually killing myself, thoughts of hurting others    Things I am able to do on my own to cope or help me feel better: watching a favorite tv show or movie, listening to music I enjoy, going outside and breathing fresh air, going for a walk or exercising, taking a shower or bath, a cold or hot beverage, a healthy snack, drawing/coloring/painting, journaling, singing or dancing, deep breathing     I can try practicing square breathing when I begin to feel anxious - inhale through the nose for the count of 4 and  the first line on the square. Exhale through the mouth for the count of 4 for the second line of the square. Repeat to complete the square. Repeat the square as many times as needed.    I can also use my five senses to practice mindfulness and grounding. What are five things I can see, four things I can hear, three things I can feel, two things I can smell, and one thing I can taste.     Things that I am able to do with others to cope or help me feel better: sometimes just talking or spending time with someone else, sharing a meal or having coffee, watching a movie or playing a game, going for a walk or exercising    I can also use community resources including mental health hotlines, [facility] crisis teams, or apps.     Things I can use or do for distraction: movies/tv, music, reading, games, drawing/coloring/painting or other art, essential oils, exercise, cleaning/organizing, puzzles, crossword puzzles, word search, Sudoku       I can also download a meditation or relaxation vladimir, like Calm, Headspace, or Insight Timer (all three offer a free version)    Changes I can make to support my mental health and wellness: Attend scheduled mental health therapy and psychiatric appointments. Take my medications as prescribed. Maintain a daily schedule/routine. Abstain from all mood altering substances, including drugs, alcohol, or medications not currently prescribed to me. Implement a self-care routine.      People in my life that I can ask for help: friends or family, trusted teachers/staff/colleagues, trusted members of my community or place of Sabianism, mental health crisis lines, or 911    Your [facility] has a mental health crisis team you can call 24/7: [facility], [phone]    Other things that are important when I m in crisis: to remember that the feelings I am having right now are temporary, and it won't feel like this forever, and that it is okay and important to ask for help    Crisis Lines  Crisis Text  "Line  Text 130436  You will be connected with a trained live crisis counselor to provide support.    Por olga, apo  AUGUSTIN a 249387 o texto a 442-AYUDAME en WhatsApp    National Hope Line  1.800.SUICIDE [7397613]      Community Resources  Fast Tracker  Linking people to mental health and substance use disorder resources  fasttrackFaculten.org     [facility] Warm Line  Peer to peer support  Monday thru Saturday, 12 pm to 10 pm  536.857.6225 or 9.467.714.1243  Text \"Support\" to 93004    National [place] [employer] Mental Illness (ZITA)  992.887.6235 or 1.888.ZITA.HELPS      Mental Health Apps  My3  https://"[employer]".org/    VirtualHopeBox  https://[employer]/apps/virtual-hope-box/      Additional Information  Today you were seen by a licensed mental health professional through Triage and Transition services, Behavioral Healthcare Providers ([facility])  for a crisis assessment in the Emergency Department at [facility].  It is recommended that you follow up with your established providers (psychiatrist, mental health therapist, and/or primary care doctor - as relevant) as soon as possible. Coordinators from [facility] will be calling you in the next 24-48 hours to ensure that you have the resources you need.  You can also contact [facility] coordinators directly at [phone]. You may have been scheduled for or offered an appointment with a mental health provider. [facility] maintains an extensive network of licensed behavioral health providers to connect patients with the services they need.  We do not charge providers a fee to participate in our referral network.  We match patients with providers based on a patient's specific needs, insurance coverage, and location.  Our first effort will be to refer you to a provider within your care system, and will utilize providers outside your care system as needed.        "

## 2023-10-26 ENCOUNTER — OFFICE VISIT (OUTPATIENT)
Dept: FAMILY MEDICINE | Facility: CLINIC | Age: 18
End: 2023-10-26

## 2023-10-26 VITALS
OXYGEN SATURATION: 98 % | HEART RATE: 83 BPM | BODY MASS INDEX: 26.31 KG/M2 | WEIGHT: 173.6 LBS | DIASTOLIC BLOOD PRESSURE: 72 MMHG | HEIGHT: 68 IN | SYSTOLIC BLOOD PRESSURE: 105 MMHG

## 2023-10-26 DIAGNOSIS — R55 SYNCOPE, UNSPECIFIED SYNCOPE TYPE: ICD-10-CM

## 2023-10-26 DIAGNOSIS — F41.9 ANXIETY: Primary | ICD-10-CM

## 2023-10-26 PROCEDURE — 36415 COLL VENOUS BLD VENIPUNCTURE: CPT

## 2023-10-26 PROCEDURE — 99213 OFFICE O/P EST LOW 20 MIN: CPT

## 2023-10-26 PROCEDURE — 84443 ASSAY THYROID STIM HORMONE: CPT

## 2023-10-26 NOTE — PROGRESS NOTES
"  Assessment & Plan     Anxiety  -has not had TSH drawn - does go to therapy for anxiety but believe patient might benefit from medication, declines at this time, will run TSH today to see if any pathological cause  -Red flags that warrant emergent evaluation discussed  -Patient verbalized understanding and is agreeable to the discussed plan of care.     - TSH with free T4 reflex  - TSH with free T4 reflex    Syncope, unspecified syncope type  -see plan above  - TSH with free T4 reflex  - TSH with free T4 reflex      Ordering of each unique test       MED REC REQUIRED  Post Medication Reconciliation Status:     BMI:   Estimated body mass index is 26.4 kg/m  as calculated from the following:    Height as of this encounter: 1.727 m (5' 8\").    Weight as of this encounter: 78.7 kg (173 lb 9.6 oz).       See Patient Instructions    No follow-ups on file.    DERREK Ruiz [facility]    Mahsa Segura is a 18 year old, presenting for the following health issues:  ER F/U (Syncope and collapse)    HPI     Patient woke up that day around 1030-11am and had syncopal episode a little before noon. Had not eaten anything that day. 630-7pm night before had normal dinner and also had a snack in the night. She was going into a bagel shop and felt hot, did not notice palpitations, felt lightheaded, went pale and then passed out, it was witnessed by her friend, no signs of seizure. Mom is present upon exam today and feels they did not get answers as to what may have happened from ER. Appears they conducted a cardiac work up with normal EKG - hypotensive in ER. Neurologically intact. She did go to EMPATH unit for more help with her anxiety     Headaches 1 time per day - temples - tense headaches - few months - headaches last at least 1 hour   Denies nausea,   Sometimes has photophobia   Irritability with headaches     Tylenol and ibuprofen without much relief.     Patient works as a para in a school and a " "           Review of Systems   Constitutional, HEENT, cardiovascular, pulmonary, gi and gu systems are negative, except as otherwise noted.      Objective    /72   Pulse 83   Ht 1.727 m (5' 8\")   Wt 78.7 kg (173 lb 9.6 oz)   SpO2 98%   BMI 26.40 kg/m    Body mass index is 26.4 kg/m .  Physical Exam   GENERAL: healthy, alert and no distress  EYES: Eyes grossly normal to inspection, PERRL and conjunctivae and sclerae normal  HENT: ear canals and TM's normal, nose and mouth without ulcers or lesions  NECK: no adenopathy, no asymmetry, masses, or scars and thyroid normal to palpation  RESP: lungs clear to auscultation - no rales, rhonchi or wheezes  CV: regular rate and rhythm, normal S1 S2, no S3 or S4, no murmur, click or rub, no peripheral edema and peripheral pulses strong  ABDOMEN: soft, nontender, no hepatosplenomegaly, no masses and bowel sounds normal  MS: no gross musculoskeletal defects noted, no edema  SKIN: no suspicious lesions or rashes  NEURO: Normal strength and tone, mentation intact and speech normal    Results for orders placed or performed in visit on 10/26/23   TSH with free T4 reflex     Status: Normal   Result Value Ref Range    TSH 1.71 0.30 - 4.20 uIU/mL                     "

## 2023-10-27 LAB — TSH SERPL DL<=0.005 MIU/L-ACNC: 1.71 UIU/ML (ref 0.3–4.2)

## 2024-01-26 NOTE — PATIENT INSTRUCTIONS
Drug Anticholinergic Drowsiness Insomnia/  agitation Orthostatic hypotension QTc prolongation* Gastrointestinal toxicity Weight gain Sexual dysfunction   Selective serotonin reuptake inhibitors    Citalopram 0 0 1+ 1+ 3+? 1+  1+ 3+   Escitalopram 0 0 1+ 1+ 2+ 1+  1+ 3+   Fluoxetine 0 0 2+ 1+ 1+ 1+  0 3+   Fluvoxamine 0 1+ 1+ 1+ 1+ 1+  1+ 3+   Paroxetine 1+ 1+ 1+ 2+ 0 to 1+ 1+  2+ 4+   Sertraline 0 0 2+ 1+ 1 to 2+ 2+ ? 1+ 3+     Propranolol - as needed for anxiety and headaches       Preventive Health Recommendations  Female Ages 18 to 20     Yearly exam:   See your health care provider every year in order to  Review health changes.   Discuss preventive care.    Review your medicines if your doctor has prescribed any.    You should be tested each year for STDs (sexually transmitted diseases).     After age 20, talk to your provider about how often you should have cholesterol testing.    If you are at risk for diabetes, you should have a diabetes test (fasting glucose).     Shots:   Get a flu shot each year.   Get a tetanus shot every 10 years.   Consider getting the shot (vaccine) that prevents cervical cancer (Gardasil).    Nutrition:   Eat at least 5 servings of fruits and vegetables each day.  Eat whole-grain bread, whole-wheat pasta and brown rice instead of white grains and rice.  Get adequate Calcium and Vitamin D.     Lifestyle  Exercise at least 150 minutes a week each week (30 minutes a day, 5 days a week). This will help you control your weight and prevent disease.  No smoking.   Wear sunscreen to prevent skin cancer.  See your dentist every six months for an exam and cleaning.

## 2024-01-26 NOTE — PROGRESS NOTES
SUBJECTIVE:   CC: Imelda Rodarte is an 18 year old woman who presents for preventive health visit.     Adderall XR 30mg: patient states feels it wears off around 1230pm. Takes it at 0700. Patient has been on adderall XR since about age 5 years old    Birth control: takes placebo pill and does not get her period at this time. Acne worse and bloating worse. Stopped getting period when at college, LMP: sometime last year. Not sexually active. Has been taking pills at the same time everyday - not missing doses.     Cough: cough for the past year, coughing up phlegm intermittently, mild wheezing, worse in the morning, no known triggers happens all throughout the day at work and at home. Feels phlegm is in the back of throat, not deep in chest.        Patient has been advised of split billing requirements and indicates understanding: Yes  Healthy Habits:  Do you get at least three servings of calcium containing foods daily (dairy, green leafy vegetables, etc.)? yes  Amount of exercise or daily activities, outside of work: 5 day(s) per week  Problems taking medications regularly No  Medication side effects: No  Have you had an eye exam in the past two years? yes  Do you see a dentist twice per year? yes  Do you have sleep apnea, excessive snoring or daytime drowsiness?no    Today's PHQ-2 Score:       1/29/2024     8:12 AM 3/23/2023     3:34 PM   PHQ-2 ( 1999 [employer])   Q1: Little interest or pleasure in doing things 1 0   Q2: Feeling down, depressed or hopeless 1 0   PHQ-2 Score 2 0       Abuse: Current or Past(Physical, Sexual or Emotional)- No  Do you feel safe in your environment? Yes        Social History     Tobacco Use    Smoking status: Never    Smokeless tobacco: Never   Substance Use Topics    Alcohol use: Yes     If you drink alcohol do you typically have >3 drinks per day or >7 drinks per week? No                     Reviewed orders with patient.  Reviewed health maintenance and updated orders accordingly -  "Yes  Lab work is in process        Pertinent mammograms are reviewed under the imaging tab.  History of abnormal Pap smear: NO - under age 21, PAP not appropriate for age     Reviewed and updated as needed this visit by clinical staff   Tobacco  Allergies  Meds  Problems  Med Hx  Surg Hx  Fam Hx          Reviewed and updated as needed this visit by Provider   Tobacco  Allergies  Meds  Problems  Med Hx  Surg Hx  Fam Hx          Past Medical History:   Diagnosis Date    ADHD         ROS:  CONSTITUTIONAL: NEGATIVE for fever, chills, change in weight  INTEGUMENTARU/SKIN: NEGATIVE for worrisome rashes, moles or lesions  EYES: NEGATIVE for vision changes or irritation  ENT: NEGATIVE for ear, mouth and throat problems  RESP: NEGATIVE for significant cough or SOB  BREAST: NEGATIVE for masses, tenderness or discharge  CV: NEGATIVE for chest pain, palpitations or peripheral edema  GI: NEGATIVE for nausea, abdominal pain, heartburn, or change in bowel habits  : NEGATIVE for unusual urinary or vaginal symptoms. Periods are regular.  MUSCULOSKELETAL: NEGATIVE for significant arthralgias or myalgia  NEURO: NEGATIVE for weakness, dizziness or paresthesias  PSYCHIATRIC: NEGATIVE for changes in mood or affect    OBJECTIVE:   /63   Pulse 82   Ht 1.74 m (5' 8.5\")   Wt 73.9 kg (163 lb)   SpO2 99%   BMI 24.42 kg/m    EXAM:  GENERAL: alert and no distress  EYES: Eyes grossly normal to inspection, PERRL and conjunctivae and sclerae normal  HENT: pale turbinates bilaterally, white/clear drainage to posterior pharynx ear canals and TM's normal, nose and mouth without ulcers or lesions  NECK: no adenopathy, no asymmetry, masses, or scars  RESP: lungs clear to auscultation - no rales, rhonchi or wheezes  CV: regular rate and rhythm, normal S1 S2, no S3 or S4, no murmur, click or rub, no peripheral edema  ABDOMEN: soft, nontender, no hepatosplenomegaly, no masses and bowel sounds normal  MS: no gross musculoskeletal " defects noted, no edema  SKIN: cystic acne, mild along jaw line, otherwise no suspicious lesions or rashes  NEURO: Normal strength and tone, mentation intact and speech normal  PSYCH: mentation appears normal, affect normal/bright  LYMPH: no cervical, supraclavicular, axillary, or inguinal adenopathy    Diagnostic Test Results:  Labs reviewed in Epic  Results for orders placed or performed in visit on 01/29/24 (from the past 24 hour(s))   CBC with Diff/Plt ([facility])   Result Value Ref Range    WBC x10/cmm 5.3 3.8 - 11.0 x10/cmm    % Lymphocytes 36.3 20.5 - 51.1 %    % Monocytes 7.6 1.7 - 9.3 %    % Granulocytes 56.1 42.2 - 75.2 %    RBC x10/cmm 4.21 3.7 - 5.2 x10/cmm    Hemoglobin 12.4 11.8 - 15.5 g/dl    Hematocrit 36.4 35 - 46 %    MCV 86.2 80 - 100 fL    MCH 29.5 27.0 - 31.0 pg    MCHC 34.2 33.0 - 37.0 g/dL    Platelet Count 258 140 - 450 K/uL       ASSESSMENT/PLAN:   Imelda was seen today for physical.    Diagnoses and all orders for this visit:    Routine general medical examination at a health care facility  -age appropriate preventative health reviewed and discussed    Amenorrhea  -     Comprehensive metabolic panel; Future  -     VENOUS COLLECTION  -     CBC with Diff/Plt ([facility])  -     Comprehensive metabolic panel  -     Prolactin; Future  -     Follicle stimulating hormone; Future  -     Luteinizing Hormone; Future  -     Lipid Profile ([facility])  -     Prolactin  -     Follicle stimulating hormone  -     Luteinizing Hormone  - Given acne and amenorrhea will perform secondary amenorrhea work up today, discussed possibility of PCOS with patient, would like patient to return to discuss results and next steps during her follow up on ADHD medication  - Patient verbalized understanding and is agreeable to the discussed plan of care.    Wheezing  -     Spirometry, Breathing Capacity  - Unsure if actual wheezing or not, LSCTU today. Will treat for allergy picture today with follow up in 2 weeks at med check    Post nasal  "drip  -will trial cetirizine 10mg daily for 2-4 weeks to see if improvement, if not will consider further work up and possible ENT referral     Cystic acne  -will hold off on treatment - suspect could be r/t secondary amenorrhea     Attention deficit hyperactivity disorder (ADHD), combined type  -     lisdexamfetamine (VYVANSE) 30 MG capsule; Take 1 capsule (30 mg) by mouth every morning for 30 days  - Discussed possibly taking 2 doses of adderall XR 20mg (1st dose at 0700 and 2nd dose at 1200) and vyvanse (long acting) patient elected to do vyvanse to lessen pill burden, will start with 30mg with follow up in a couple weeks   - Red flags that warrant emergent evaluation discussed  - Patient verbalized understanding and is agreeable to the discussed plan of care.  - Education about adverse effects of prescription medication discussed    DEMETRIO (generalized anxiety disorder)  -would like patient to see how she does on vyvanse first and then can add in fluoxetine at follow up visit in 2 weeks if tolerating vyvanse well  -Patient verbalized understanding and is agreeable to the discussed plan of care.    Other orders  -     COVID-19 12+ (2023-24) (MODERNA)        Patient has been advised of split billing requirements and indicates understanding: Yes  COUNSELING:   Reviewed preventive health counseling, as reflected in patient instructions       Regular exercise       Healthy diet/nutrition    Estimated body mass index is 24.42 kg/m  as calculated from the following:    Height as of this encounter: 1.74 m (5' 8.5\").    Weight as of this encounter: 73.9 kg (163 lb).        She reports that she has never smoked. She has never used smokeless tobacco.      Counseling Resources:  ATP IV Guidelines  Pooled Cohorts Equation Calculator  Breast Cancer Risk Calculator  BRCA-Related Cancer Risk Assessment: FHS-7 Tool  FRAX Risk Assessment  ICSI Preventive Guidelines  Dietary Guidelines for Americans, 2010  [employer]'s MyPlate  ASA " Prophylaxis  Lung CA Screening    DERREK Ruiz CNP  [facility]

## 2024-01-29 ENCOUNTER — OFFICE VISIT (OUTPATIENT)
Dept: FAMILY MEDICINE | Facility: CLINIC | Age: 19
End: 2024-01-29

## 2024-01-29 VITALS
HEART RATE: 82 BPM | DIASTOLIC BLOOD PRESSURE: 63 MMHG | HEIGHT: 69 IN | BODY MASS INDEX: 24.14 KG/M2 | OXYGEN SATURATION: 99 % | SYSTOLIC BLOOD PRESSURE: 119 MMHG | WEIGHT: 163 LBS

## 2024-01-29 DIAGNOSIS — L70.0 CYSTIC ACNE: ICD-10-CM

## 2024-01-29 DIAGNOSIS — F41.1 GAD (GENERALIZED ANXIETY DISORDER): ICD-10-CM

## 2024-01-29 DIAGNOSIS — N91.2 AMENORRHEA: ICD-10-CM

## 2024-01-29 DIAGNOSIS — R09.82 POST-NASAL DRIP: ICD-10-CM

## 2024-01-29 DIAGNOSIS — R06.2 WHEEZING: ICD-10-CM

## 2024-01-29 DIAGNOSIS — Z00.00 ROUTINE GENERAL MEDICAL EXAMINATION AT A HEALTH CARE FACILITY: Primary | ICD-10-CM

## 2024-01-29 DIAGNOSIS — F90.2 ATTENTION DEFICIT HYPERACTIVITY DISORDER (ADHD), COMBINED TYPE: ICD-10-CM

## 2024-01-29 LAB
% GRANULOCYTES: 56.1 % (ref 42.2–75.2)
ALBUMIN SERPL BCG-MCNC: 4.5 G/DL (ref 3.5–5.2)
ALP SERPL-CCNC: 80 U/L (ref 40–150)
ALT SERPL W P-5'-P-CCNC: 12 U/L (ref 0–50)
ANION GAP SERPL CALCULATED.3IONS-SCNC: 9 MMOL/L (ref 7–15)
AST SERPL W P-5'-P-CCNC: 19 U/L (ref 0–45)
BILIRUB SERPL-MCNC: 0.8 MG/DL
BUN SERPL-MCNC: 12.7 MG/DL (ref 6–20)
CALCIUM SERPL-MCNC: 9.6 MG/DL (ref 8.6–10)
CHLORIDE SERPL-SCNC: 104 MMOL/L (ref 98–107)
CHOLESTEROL: 170 MG/DL (ref 100–199)
CREAT SERPL-MCNC: 0.81 MG/DL (ref 0.51–0.95)
DEPRECATED HCO3 PLAS-SCNC: 27 MMOL/L (ref 22–29)
EGFRCR SERPLBLD CKD-EPI 2021: >90 ML/MIN/1.73M2
FASTING?: YES
FEF 25/75: NORMAL
FEV-1: NORMAL
FEV1/FVC: NORMAL
FSH SERPL IRP2-ACNC: 1.6 MIU/ML
FVC: NORMAL
GLUCOSE SERPL-MCNC: 96 MG/DL (ref 70–99)
HCT VFR BLD AUTO: 36.4 % (ref 35–46)
HDL (RMG): 41 MG/DL (ref 40–?)
HEMOGLOBIN: 12.4 G/DL (ref 11.8–15.5)
LDL CALCULATED (RMG): 105 MG/DL (ref 0–130)
LH SERPL-ACNC: 0.9 MIU/ML
LYMPHOCYTES NFR BLD AUTO: 36.3 % (ref 20.5–51.1)
MCH RBC QN AUTO: 29.5 PG (ref 27–31)
MCHC RBC AUTO-ENTMCNC: 34.2 G/DL (ref 33–37)
MCV RBC AUTO: 86.2 FL (ref 80–100)
MONOCYTES NFR BLD AUTO: 7.6 % (ref 1.7–9.3)
PLATELET # BLD AUTO: 258 K/UL (ref 140–450)
POTASSIUM SERPL-SCNC: 3.9 MMOL/L (ref 3.4–5.3)
PROLACTIN SERPL 3RD IS-MCNC: 16 NG/ML (ref 5–23)
PROT SERPL-MCNC: 7.6 G/DL (ref 6.4–8.3)
RBC # BLD AUTO: 4.21 X10/CMM (ref 3.7–5.2)
SODIUM SERPL-SCNC: 140 MMOL/L (ref 135–145)
TRIGLYCERIDES (RMG): 118 MG/DL (ref 0–149)
WBC # BLD AUTO: 5.3 X10/CMM (ref 3.8–11)

## 2024-01-29 PROCEDURE — 82166 ASSAY ANTI-MULLERIAN HORM: CPT

## 2024-01-29 PROCEDURE — 36415 COLL VENOUS BLD VENIPUNCTURE: CPT

## 2024-01-29 PROCEDURE — 94010 BREATHING CAPACITY TEST: CPT

## 2024-01-29 PROCEDURE — 83001 ASSAY OF GONADOTROPIN (FSH): CPT

## 2024-01-29 PROCEDURE — 80061 LIPID PANEL: CPT | Mod: QW

## 2024-01-29 PROCEDURE — 80053 COMPREHEN METABOLIC PANEL: CPT

## 2024-01-29 PROCEDURE — 91322 SARSCOV2 VAC 50 MCG/0.5ML IM: CPT

## 2024-01-29 PROCEDURE — 84146 ASSAY OF PROLACTIN: CPT

## 2024-01-29 PROCEDURE — 83002 ASSAY OF GONADOTROPIN (LH): CPT

## 2024-01-29 PROCEDURE — 99213 OFFICE O/P EST LOW 20 MIN: CPT | Mod: 25

## 2024-01-29 PROCEDURE — 85025 COMPLETE CBC W/AUTO DIFF WBC: CPT

## 2024-01-29 PROCEDURE — 90480 ADMN SARSCOV2 VAC 1/ONLY CMP: CPT | Mod: 59

## 2024-01-29 PROCEDURE — 99395 PREV VISIT EST AGE 18-39: CPT

## 2024-01-29 RX ORDER — LISDEXAMFETAMINE DIMESYLATE 30 MG/1
30 CAPSULE ORAL EVERY MORNING
Qty: 30 CAPSULE | Refills: 0 | Status: SHIPPED | OUTPATIENT
Start: 2024-01-29 | End: 2024-02-28

## 2024-01-29 ASSESSMENT — ANXIETY QUESTIONNAIRES
1. FEELING NERVOUS, ANXIOUS, OR ON EDGE: MORE THAN HALF THE DAYS
2. NOT BEING ABLE TO STOP OR CONTROL WORRYING: MORE THAN HALF THE DAYS
GAD7 TOTAL SCORE: 11
GAD7 TOTAL SCORE: 11
5. BEING SO RESTLESS THAT IT IS HARD TO SIT STILL: MORE THAN HALF THE DAYS
7. FEELING AFRAID AS IF SOMETHING AWFUL MIGHT HAPPEN: NOT AT ALL
IF YOU CHECKED OFF ANY PROBLEMS ON THIS QUESTIONNAIRE, HOW DIFFICULT HAVE THESE PROBLEMS MADE IT FOR YOU TO DO YOUR WORK, TAKE CARE OF THINGS AT HOME, OR GET ALONG WITH OTHER PEOPLE: SOMEWHAT DIFFICULT
3. WORRYING TOO MUCH ABOUT DIFFERENT THINGS: SEVERAL DAYS
6. BECOMING EASILY ANNOYED OR IRRITABLE: MORE THAN HALF THE DAYS

## 2024-01-29 ASSESSMENT — PATIENT HEALTH QUESTIONNAIRE - PHQ9
SUM OF ALL RESPONSES TO PHQ QUESTIONS 1-9: 6
5. POOR APPETITE OR OVEREATING: MORE THAN HALF THE DAYS

## 2024-01-31 LAB — MIS SERPL-MCNC: 3.97 NG/ML (ref 1.2–12)

## 2024-02-09 DIAGNOSIS — N91.2 AMENORRHEA: ICD-10-CM

## 2024-02-09 PROCEDURE — 82627 DEHYDROEPIANDROSTERONE: CPT

## 2024-02-09 PROCEDURE — 36415 COLL VENOUS BLD VENIPUNCTURE: CPT

## 2024-02-12 LAB — DHEA-S SERPL-MCNC: 212 UG/DL (ref 35–430)

## 2024-02-15 ENCOUNTER — OFFICE VISIT (OUTPATIENT)
Dept: FAMILY MEDICINE | Facility: CLINIC | Age: 19
End: 2024-02-15

## 2024-02-15 ENCOUNTER — MYC MEDICAL ADVICE (OUTPATIENT)
Dept: FAMILY MEDICINE | Facility: CLINIC | Age: 19
End: 2024-02-15

## 2024-02-15 VITALS
SYSTOLIC BLOOD PRESSURE: 107 MMHG | WEIGHT: 169 LBS | HEART RATE: 73 BPM | DIASTOLIC BLOOD PRESSURE: 59 MMHG | BODY MASS INDEX: 25.32 KG/M2 | OXYGEN SATURATION: 98 %

## 2024-02-15 DIAGNOSIS — F90.2 ATTENTION DEFICIT HYPERACTIVITY DISORDER (ADHD), COMBINED TYPE: ICD-10-CM

## 2024-02-15 DIAGNOSIS — F41.1 GAD (GENERALIZED ANXIETY DISORDER): Primary | ICD-10-CM

## 2024-02-15 DIAGNOSIS — F90.2 ATTENTION DEFICIT HYPERACTIVITY DISORDER (ADHD), COMBINED TYPE: Primary | ICD-10-CM

## 2024-02-15 DIAGNOSIS — N91.2 AMENORRHEA: ICD-10-CM

## 2024-02-15 DIAGNOSIS — R09.82 POST-NASAL DRIP: ICD-10-CM

## 2024-02-15 PROCEDURE — 99214 OFFICE O/P EST MOD 30 MIN: CPT

## 2024-02-15 RX ORDER — LISDEXAMFETAMINE DIMESYLATE 30 MG/1
30 CAPSULE ORAL DAILY
Qty: 30 CAPSULE | Refills: 0 | Status: CANCELLED | OUTPATIENT
Start: 2024-03-01 | End: 2024-03-31

## 2024-02-15 RX ORDER — LISDEXAMFETAMINE DIMESYLATE 50 MG/1
50 CAPSULE ORAL DAILY
Qty: 30 CAPSULE | Refills: 0 | Status: SHIPPED | OUTPATIENT
Start: 2024-04-17 | End: 2024-03-21

## 2024-02-15 RX ORDER — LISDEXAMFETAMINE DIMESYLATE 30 MG/1
30 CAPSULE ORAL DAILY
Qty: 30 CAPSULE | Refills: 0 | Status: CANCELLED | OUTPATIENT
Start: 2024-03-31 | End: 2024-04-30

## 2024-02-15 RX ORDER — LISDEXAMFETAMINE DIMESYLATE 50 MG/1
50 CAPSULE ORAL DAILY
Qty: 30 CAPSULE | Refills: 0 | Status: SHIPPED | OUTPATIENT
Start: 2024-02-15 | End: 2024-03-16

## 2024-02-15 RX ORDER — LISDEXAMFETAMINE DIMESYLATE 50 MG/1
50 CAPSULE ORAL DAILY
Qty: 30 CAPSULE | Refills: 0 | Status: SHIPPED | OUTPATIENT
Start: 2024-03-17 | End: 2024-03-21

## 2024-02-15 RX ORDER — LISDEXAMFETAMINE DIMESYLATE 30 MG/1
30 CAPSULE ORAL DAILY
Qty: 30 CAPSULE | Refills: 0 | Status: CANCELLED | OUTPATIENT
Start: 2024-04-30 | End: 2024-05-30

## 2024-02-15 ASSESSMENT — PATIENT HEALTH QUESTIONNAIRE - PHQ9
SUM OF ALL RESPONSES TO PHQ QUESTIONS 1-9: 12
5. POOR APPETITE OR OVEREATING: MORE THAN HALF THE DAYS

## 2024-02-15 ASSESSMENT — ANXIETY QUESTIONNAIRES
7. FEELING AFRAID AS IF SOMETHING AWFUL MIGHT HAPPEN: NOT AT ALL
IF YOU CHECKED OFF ANY PROBLEMS ON THIS QUESTIONNAIRE, HOW DIFFICULT HAVE THESE PROBLEMS MADE IT FOR YOU TO DO YOUR WORK, TAKE CARE OF THINGS AT HOME, OR GET ALONG WITH OTHER PEOPLE: SOMEWHAT DIFFICULT
2. NOT BEING ABLE TO STOP OR CONTROL WORRYING: MORE THAN HALF THE DAYS
1. FEELING NERVOUS, ANXIOUS, OR ON EDGE: NEARLY EVERY DAY
6. BECOMING EASILY ANNOYED OR IRRITABLE: NEARLY EVERY DAY
GAD7 TOTAL SCORE: 13
GAD7 TOTAL SCORE: 13
3. WORRYING TOO MUCH ABOUT DIFFERENT THINGS: SEVERAL DAYS
5. BEING SO RESTLESS THAT IT IS HARD TO SIT STILL: MORE THAN HALF THE DAYS

## 2024-02-15 NOTE — PROGRESS NOTES
Assessment & Plan     Attention deficit hyperactivity disorder (ADHD), combined type  -will increase to 50mg with recheck in 1 month  -Education about adverse effects of prescription medication discussed  -Red flags that warrant emergent evaluation discussed  -Patient verbalized understanding and is agreeable to the discussed plan of care.    - lisdexamfetamine (VYVANSE) 50 MG capsule  Dispense: 30 capsule; Refill: 0  - lisdexamfetamine (VYVANSE) 50 MG capsule  Dispense: 30 capsule; Refill: 0  - lisdexamfetamine (VYVANSE) 50 MG capsule  Dispense: 30 capsule; Refill: 0    DEMETRIO (generalized anxiety disorder)  -given increased anxiety and depression would like to start on selective serotonin reuptake inhibitor - decided on fluoxetine due to weight gain on previous selective serotonin reuptake inhibitor medications  -Education about adverse effects of prescription medication discussed  -Patient verbalized understanding and is agreeable to the discussed plan of care.    Recheck in 1 month  - FLUoxetine (PROZAC) 20 MG capsule  Dispense: 30 capsule; Refill: 0    Amenorrhea  -declines pelvic today, will do this at medication follow up in 1 month    Post-nasal drip  -continue with zyrtec          See Patient Instructions    No follow-ups on file.    Mahsa Segura is a 18 year old, presenting for the following health issues:  Recheck Medication (Vyvanse - CSA UTD. Picked up 1/31/24 ( reviewed) - 3 month supply pended) and RECHECK (Cetirizine - helping with post nasal drip)    HPI   Numerous follow up today:    Amenorrhea: started on OCP for acne and menorrhagia in high school. She had normal cycle after starting and improvement in acne. She reports 1 year ago was her last normal menstrual period with bleeding and she has not had bleeding since. She takes her OCP as prescribed. No new changes in her life. She is not sexually active and had recent negative pregnancy test in 10/2023. She does get bloating during sugar  pills but no bleeding. She had recent lab work up for PCOS and all lab work returned normal. She has never had a pelvic exam and does not want to do one today.      ADHD: switched to vyvanse as patient felt was not covered well throughout the day. Sees therapist weekly. She reports anxiety and stress are elevated. Still has afternoon fatigue on vyvanse. Wondering if can increase dose.      Post nasal drip: feels this is improving on zyrtec and will continue               Review of Systems  Constitutional, HEENT, cardiovascular, pulmonary, gi and gu systems are negative, except as otherwise noted.      Objective    /59   Pulse 73   Wt 76.7 kg (169 lb)   SpO2 98%   BMI 25.32 kg/m    Body mass index is 25.32 kg/m .  Physical Exam   GENERAL: alert and no distress  EYES: Eyes grossly normal to inspection, PERRL and conjunctivae and sclerae normal  RESP: unlabored breathing, equal chest rise   MS: no gross musculoskeletal defects noted, no edema    No results found for this or any previous visit (from the past 24 hour(s)).        Signed Electronically by: DERREK Ruiz CNP

## 2024-02-16 RX ORDER — LISDEXAMFETAMINE DIMESYLATE 50 MG/1
50 CAPSULE ORAL EVERY MORNING
Qty: 18 CAPSULE | Refills: 0 | Status: SHIPPED | OUTPATIENT
Start: 2024-02-16 | End: 2024-03-05

## 2024-03-21 ENCOUNTER — OFFICE VISIT (OUTPATIENT)
Dept: FAMILY MEDICINE | Facility: CLINIC | Age: 19
End: 2024-03-21

## 2024-03-21 VITALS
BODY MASS INDEX: 24.72 KG/M2 | DIASTOLIC BLOOD PRESSURE: 55 MMHG | SYSTOLIC BLOOD PRESSURE: 116 MMHG | OXYGEN SATURATION: 97 % | HEART RATE: 80 BPM | WEIGHT: 165 LBS

## 2024-03-21 DIAGNOSIS — F41.1 GAD (GENERALIZED ANXIETY DISORDER): Primary | ICD-10-CM

## 2024-03-21 DIAGNOSIS — N93.9 ABNORMAL VAGINAL BLEEDING: ICD-10-CM

## 2024-03-21 DIAGNOSIS — N89.5 VAGINAL STRICTURE: ICD-10-CM

## 2024-03-21 DIAGNOSIS — F90.2 ATTENTION DEFICIT HYPERACTIVITY DISORDER (ADHD), COMBINED TYPE: ICD-10-CM

## 2024-03-21 PROCEDURE — 99214 OFFICE O/P EST MOD 30 MIN: CPT

## 2024-03-21 RX ORDER — FLUOXETINE 40 MG/1
40 CAPSULE ORAL DAILY
Qty: 30 CAPSULE | Refills: 0 | Status: SHIPPED | OUTPATIENT
Start: 2024-03-21 | End: 2024-04-17

## 2024-03-21 RX ORDER — LISDEXAMFETAMINE DIMESYLATE 60 MG/1
60 CAPSULE ORAL EVERY MORNING
Qty: 30 CAPSULE | Refills: 0 | Status: SHIPPED | OUTPATIENT
Start: 2024-03-21 | End: 2024-04-17

## 2024-03-21 ASSESSMENT — ANXIETY QUESTIONNAIRES
7. FEELING AFRAID AS IF SOMETHING AWFUL MIGHT HAPPEN: NOT AT ALL
5. BEING SO RESTLESS THAT IT IS HARD TO SIT STILL: SEVERAL DAYS
6. BECOMING EASILY ANNOYED OR IRRITABLE: SEVERAL DAYS
2. NOT BEING ABLE TO STOP OR CONTROL WORRYING: NOT AT ALL
GAD7 TOTAL SCORE: 3
3. WORRYING TOO MUCH ABOUT DIFFERENT THINGS: NOT AT ALL
1. FEELING NERVOUS, ANXIOUS, OR ON EDGE: SEVERAL DAYS
GAD7 TOTAL SCORE: 3

## 2024-03-21 ASSESSMENT — PATIENT HEALTH QUESTIONNAIRE - PHQ9
SUM OF ALL RESPONSES TO PHQ QUESTIONS 1-9: 6
5. POOR APPETITE OR OVEREATING: NOT AT ALL

## 2024-03-21 NOTE — PROGRESS NOTES
Assessment & Plan     DEMETRIO (generalized anxiety disorder)  -increased fluoxetine, discussed 30mg vs 40mg decided to increase to 40mg if stable can refill for 1 year for patient     - FLUoxetine (PROZAC) 40 MG capsule  Dispense: 30 capsule; Refill: 0    Attention deficit hyperactivity disorder (ADHD), combined type  Will increase vyvanse to 60mg with follow up in 1 month    Abnormal vaginal bleeding/vaginal stricture  -given intolerance and tight vaginal canal upon exam will refer to ob/gyn for further work up and possible dilation work up and abnormal menses schedule   -Patient verbalized understanding and is agreeable to the discussed plan of care.     - Ob/Gyn  Referral - To a [facility] Location (Use POS/Location)      See Patient Instructions    No follow-ups on file.    Mahsa Segura is a 18 year old, presenting for the following health issues:  Gyn Exam (PAP and pelvic exam) and Medication Refill (Needs prozac sent in, feels comfortable at current dosing. Denies side effects)    HPI     Anxiety: feels much improved on fluoxetine would like to increase dose a little but feels it is helping     Pelvic: doing pap today given abnormal menstrual cycle, patient very nervous about exam. Patient has never had vaginal sex. Rarely uses tampons due to pain with insertion, usually has to use a light tampon and even that is uncomfortable.           Review of Systems  Constitutional, HEENT, cardiovascular, pulmonary, gi and gu systems are negative, except as otherwise noted.      Objective    /55   Pulse 80   Wt 74.8 kg (165 lb)   SpO2 97%   BMI 24.72 kg/m    Body mass index is 24.72 kg/m .  Physical Exam   GENERAL: alert and no distress  RESP: unlabored breathing, equal chest rise   ABDOMEN: soft, nontender, no hepatosplenomegaly, no masses and bowel sounds normal   (female): unable to place pediatric speculum due to patient intolerance from pain, also getting resistance with  insertion, otherwise normal female external genitalia, normal urethral meatus  SKIN: no suspicious lesions or rashes  NEURO: Normal strength and tone, mentation intact and speech normal    No results found for this or any previous visit (from the past 24 hour(s)).        Signed Electronically by: DERREK Ruiz CNP

## 2024-04-14 DIAGNOSIS — F41.1 GAD (GENERALIZED ANXIETY DISORDER): ICD-10-CM

## 2024-04-15 NOTE — TELEPHONE ENCOUNTER
Med: FLUoxetine (PROZAC) 40 MG capsule     LOV (related): 3/21/24      Due for F/U around: April 2024 for ADHD    Next Appt: none            1/29/2024     9:13 AM 2/15/2024     9:03 AM 3/21/2024     9:31 AM   PHQ   PHQ-9 Total Score 6 12 6   Q9: Thoughts of better off dead/self-harm past 2 weeks Not at all Not at all Not at all           1/29/2024     9:13 AM 2/15/2024     9:03 AM 3/21/2024     9:31 AM   DEMETRIO-7 SCORE   Total Score 11 13 3

## 2024-04-16 ENCOUNTER — MYC REFILL (OUTPATIENT)
Dept: FAMILY MEDICINE | Facility: CLINIC | Age: 19
End: 2024-04-16

## 2024-04-16 DIAGNOSIS — F90.2 ATTENTION DEFICIT HYPERACTIVITY DISORDER (ADHD), COMBINED TYPE: ICD-10-CM

## 2024-04-16 RX ORDER — LISDEXAMFETAMINE DIMESYLATE 60 MG/1
60 CAPSULE ORAL EVERY MORNING
Qty: 30 CAPSULE | Refills: 0 | Status: CANCELLED | OUTPATIENT
Start: 2024-04-16

## 2024-04-17 RX ORDER — FLUOXETINE 40 MG/1
40 CAPSULE ORAL DAILY
Qty: 90 CAPSULE | Refills: 0 | Status: SHIPPED | OUTPATIENT
Start: 2024-04-17 | End: 2024-05-18

## 2024-04-17 NOTE — TELEPHONE ENCOUNTER
Patient requesting pharmacy transfer of her 3/21/24 lisdexamfetamine 60mg #30 to [facility] due to [facility] is out of stock.   Plan: pended routed to Senait Lance NP to sign/send.  Jamee Ramires RN

## 2024-04-18 RX ORDER — LISDEXAMFETAMINE DIMESYLATE 60 MG/1
60 CAPSULE ORAL EVERY MORNING
Qty: 30 CAPSULE | Refills: 0 | Status: SHIPPED | OUTPATIENT
Start: 2024-04-18 | End: 2024-05-18 | Stop reason: DRUGHIGH

## 2024-05-18 ENCOUNTER — HOSPITAL ENCOUNTER (EMERGENCY)
Facility: CLINIC | Age: 19
Discharge: HOME | End: 2024-05-18
Attending: EMERGENCY MEDICINE | Admitting: EMERGENCY MEDICINE
Payer: COMMERCIAL

## 2024-05-18 VITALS
HEART RATE: 73 BPM | SYSTOLIC BLOOD PRESSURE: 122 MMHG | TEMPERATURE: 98.1 F | BODY MASS INDEX: 24.78 KG/M2 | DIASTOLIC BLOOD PRESSURE: 77 MMHG | RESPIRATION RATE: 16 BRPM | HEIGHT: 67 IN | OXYGEN SATURATION: 98 % | WEIGHT: 157.9 LBS

## 2024-05-18 DIAGNOSIS — F41.1 GAD (GENERALIZED ANXIETY DISORDER): ICD-10-CM

## 2024-05-18 DIAGNOSIS — F32.A DEPRESSION, UNSPECIFIED DEPRESSION TYPE: ICD-10-CM

## 2024-05-18 PROBLEM — F41.9 ANXIETY: Status: ACTIVE | Noted: 2023-10-20

## 2024-05-18 PROCEDURE — 99244 OFF/OP CNSLTJ NEW/EST MOD 40: CPT | Performed by: PSYCHIATRY & NEUROLOGY

## 2024-05-18 PROCEDURE — 99284 EMERGENCY DEPT VISIT MOD MDM: CPT

## 2024-05-18 RX ORDER — TRAZODONE HYDROCHLORIDE 50 MG/1
25-50 TABLET, FILM COATED ORAL
Qty: 30 TABLET | Refills: 0 | Status: SHIPPED | OUTPATIENT
Start: 2024-05-18 | End: 2024-06-17

## 2024-05-18 ASSESSMENT — COLUMBIA-SUICIDE SEVERITY RATING SCALE - C-SSRS
3. HAVE YOU BEEN THINKING ABOUT HOW YOU MIGHT KILL YOURSELF?: NO
5. HAVE YOU STARTED TO WORK OUT OR WORKED OUT THE DETAILS OF HOW TO KILL YOURSELF? DO YOU INTEND TO CARRY OUT THIS PLAN?: NO
6. HAVE YOU EVER DONE ANYTHING, STARTED TO DO ANYTHING, OR PREPARED TO DO ANYTHING TO END YOUR LIFE?: YES
1. IN THE PAST MONTH, HAVE YOU WISHED YOU WERE DEAD OR WISHED YOU COULD GO TO SLEEP AND NOT WAKE UP?: YES
4. HAVE YOU HAD THESE THOUGHTS AND HAD SOME INTENTION OF ACTING ON THEM?: YES
2. HAVE YOU ACTUALLY HAD ANY THOUGHTS OF KILLING YOURSELF IN THE PAST MONTH?: YES

## 2024-05-18 ASSESSMENT — ACTIVITIES OF DAILY LIVING (ADL)
ADLS_ACUITY_SCORE: 35

## 2024-05-18 NOTE — ED PROVIDER NOTES
"EmPATH Unit - Psychiatric Consultation  [facility] Emergency Department    Imelda Rodarte MRN: [id number]   Age: 18 year old YOB: 2005     History     Chief Complaint   Patient presents with    Depression     HPI  Imelda Rodarte is a 18 year old female with history notable for depression, anxiety, and ADHD who presents to the ER  with worsening mood and anxiety symptoms.   Pt was medically evaluated and cleared in the emergency room prior to transfer to the EmPATH unit.  Any labs and imaging completed in the emergency room have been reviewed.     Patient presents today with worsening symptoms over the past 2-3 months.  Patient with multiple psychiatric medication changes made by her primary care provider during this time.  Patient had been on Adderall XR 30 mg daily for ADHD since about age 5.  Reported that it was wearing off too early in the day and so she transitioned to Vyvanse 30 mg daily.  This was increased up to 60 mg daily.  Fluoxetine was also added and increased up to 40 mg daily.  Over the past few weeks in particular, patient has been unable to sleep, felt very restless, decreased appetite.  Patient has lost at least about 10 pounds in the last few months per chart review.  Patient reports being very \"body-conscious\" and reports that she will not take anything that even hints at a chance of gaining weight.  She does report initially she felt a lot better on just the 20 mg of fluoxetine and her Vyvanse and maybe she should not have further increase the dose.  She has been missing work the past week due to her symptoms.  She also reports shoplifting -she was going to a store make her return and then steal anything from shoes to sweaters.  She reports this is very uncharacteristic of her and she has never shoplifted in the past.  She states she did it \"just to feel something.\"  She denies any history of legal trouble.  Patient also spent a few thousand dollars in a short period of time.  " This is also unlike patient.  Patient's roommate became concerned with how different patient has been acting and called patient's mother.  Patient's mother then brought patient here for evaluation.  No auditory or visual hallucinations.  No delusions or paranoia.  Denies any suicidal ideation or thoughts of self-harm.  No thoughts of wanting to harm others.  Is requesting medication evaluation but wants to discharge today from the unit.  See [facility] note below for additional details.    Recent Med Timeline:  1/29/2024 visit to pcp: was on Adderall XR 30 mg daily (since about age 5) but wearing off too early in day and so switched to  Vyvanse 30 mg daily   2/15/2024: visit with pcp vyvanse increased to 50 mg and fluoxetine added at 20 mg daily   3/21/2024: visit to pcp and fluoxetine increased to 40 mg daily and Vyvanse increased to 60 mg     Per [facility] note 5/18/2024:  Referral Data and Chief Complaint  Imelda Rodarte presents to the ED with family/friends (Pt arrived with her mom, Carolee Rodarte. Pt signed CLARA.). Patient is presenting to the ED for the following concerns: Suicidal ideation, Depression.   Factors that make the mental health crisis life threatening or complex are:  Patient arrived to the emergency department with worsening depression over the last few days. Patient says she's lost interest in things she typically enjoys, decreased appetite, and thoughts that life would be better if she weren't around. 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 Patient denies homicidal ideation, auditory and visual hallucinations. Patient is interested in seeing the medical provider at [facility].    History of the Crisis   Patient is a 18 year old female with a history of ADHD and anxiety. Patient arrived to the emergency department with her mom due to worsening depression symptoms the past few weeks. Patient states she does not  have motivation to go to work, do the things she enjoys, and has lost 15lbs in the past 2-3 months due to decreased appetite. Patient also endorses passive suicidal ideation and trouble falling asleep at night. Patient decided to come to the hospital because her loved onces have noticed a change in her behavior. Patient states she sees an outpatient mental health therapist once a week. She says she takes 40mg of prozac for anxiety and 60mg vyvanse daily, she receives medication management from her primary care provider. Patient denies homicidal ideation, auditory and visual hallucinations. Patient is interested in seeing the medical provider at [facility] and would like to establish an outpatient psychiatry provider.    Per [place]:  04/18/2024 04/18/2024 04/18/2024 1   Lisdexamfetamine 60 Mg Capsule  30.00 30 Me Dut [id number] Marco Antonio (1931) 0/0  [place]  03/13/2024 02/15/2024 03/19/2024 2   Lisdexamfetamine 50 Mg Capsule  18.00 18 Me Dut [id number] ([id number]) 0/0  [place]  02/15/2024 02/15/2024 02/18/2024 1   Vyvanse 50 Mg Capsule  30.00 30 Me Dut [id number] Marco Antonio (1931) 0/[place]  01/29/2024 01/29/2024 01/31/2024 1   Lisdexamfetamine 30 Mg Capsule  30.00 30 Me Dut [id number] Marco Antonio (1931) 0/[place]    Past Medical History  Past Medical History:   Diagnosis Date    ADHD      Past Surgical History:   Procedure Laterality Date    CYST REMOVAL      under eye    TONSILLECTOMY       FLUoxetine (PROZAC) 20 MG capsule  norethindrone-ethinyl estradiol (JUNEL FE 1/20) 1-20 MG-MCG tablet  traZODone (DESYREL) 50 MG tablet  adapalene (DIFFERIN) 0.1 % external cream      No Known Allergies  Family History  Family History   Problem Relation Age of Onset    No Known Problems Mother     No Known Problems Father      Social History   Social History     Tobacco Use    Smoking status: Never    Smokeless tobacco: Never   Vaping Use    Vaping status: Never Used   Substance Use Topics    Alcohol use: Yes    Drug use: Not Currently         "  Review of Systems  A medically appropriate review of systems was performed with pertinent positives and negatives noted in the HPI, and all other systems negative.    Physical Examination   BP: 109/71  Pulse: 77  Temp: 98.3  F (36.8  C)  Resp: 20  Height: 172.7 cm (5' 8\")  Weight: 77.1 kg (170 lb)  SpO2: 97 %    Physical Exam  General: Appears stated age.   Neuro: Alert and fully oriented. Extremities appear to demonstrate normal strength on visual inspection.   Integumentary/Skin: no rash visualized, normal color    Psychiatric Examination   Appearance: awake, alert, adequately groomed, and appeared as age stated  Attitude:  cooperative, very pleasant  Eye Contact:  good  Mood:  depressed  Affect:  intensity is heightened  Speech:  pressured speech  Psychomotor Behavior:  physical agitation  Thought Process:  logical, linear, and goal oriented  Associations:  no loose associations  Thought Content:  no evidence of suicidal ideation or homicidal ideation, no evidence of psychotic thought, no auditory hallucinations present, and no visual hallucinations present  Insight:  good  Judgement:  intact  Oriented to:  time, person, and place  Attention Span and Concentration:  fair  Recent and Remote Memory:  intact  Language: able to name/identify objects without impairment  Fund of Knowledge: intact with awareness of current and past events    ED Course        Labs Ordered and Resulted from Time of ED Arrival to Time of ED Departure - No data to display    Assessments & Plan (with Medical Decision Making)   Patient presenting with worsening depression and anxiety symptoms in the context of historical depression, anxiety, ADHD.  Patient with multiple psychiatric medication changes by primary care provider in the last 2-3 months.  Patient does present with possible manic symptoms possibly induced by increasing Vyvanse and addition of fluoxetine with increased dose.  Patient's worsening symptoms coincide with use of " increasing doses of Vyvanse and also increasing doses of fluoxetine.  Advised patient to take a few days off her stimulant and cut back her dose of fluoxetine to see if symptoms start to improve some.  Also discussed safety planning regarding not going into any retail stores alone and consideration of handing over any credit cards or methods that could be used for excessive spending.  Patient was agreeable to this plan.  Patient with very supportive mother and roommate.  Patient encouraged to follow up with a psychiatric prescriber-appointment made today.  Trazodone as needed also sent for sleep.  Patient denied any drug use.  Social alcohol every other week or so.  Denied problematic use.  Patient not sexually active-denies any concerns of pregnancy.  Nursing notes reviewed noting no acute issues.     I have reviewed the assessment completed by the [facility].     At the time of discharge, the patient's acute suicide risk was determined to be low due to the following factors: Reduction in the intensity of mood/anxiety symptoms that preceded the admission, denial of suicidal thoughts, denies feeling helpless or hopeless, not currently under the influence of alcohol or illicit substances, denies experiencing command hallucinations, no immediate access to firearms. The patient's acute risk could be higher if noncompliant with their treatment plan, medications, follow-up appointments or using illicit substances or alcohol. Protective factors include: social supports, stable housing, employment.    Preliminary diagnosis:    ICD-10-CM    1. Depression, unspecified depression type  F32.A       ADHD  R/O Stimulant or drug-induced (Rx) leanne      Treatment Plan:  -Discharge home  -Trazodone - start 25-50 MG AT BEDTIME as needed for sleep.   -Vyvanse/lisdexamfetamine - TAKE 3 DAYS OFF FROM THE MEDICATION, then talk with your prescriber about re-starting at a lower dose (ie 30 mg) and do not consider increase until appetite and  "sleep improve. May need to take more than 3 days off to stabilize current symptoms.   -Prozac/fluoxetine: take one day off then re-start at decreased 20 mg dose. Continue to monitor your symptoms and may need to consider alternative option if still struggling with sleep, appetite, impulsivity.    -Keep scheduled psychiatry appointment.   -Medication education provided this visit including but not limited to: Rationale for medication, importance of medication adherence, medication interactions, common medication side effects, benefits of medications.  -Individual psychotherapy recommended for additional support and ongoing development of nonpharmacologic coping skills and strategies.    -Problem focused supportive therapy and education provided today related to patient's current and acute stressors, symptoms, and diagnoses.     --  Marce Tinajero DO   [facility] EMERGENCY DEPT  EmPATH Unit     This note was created with voice recognition software. Inadvertent grammatical errors, typographical errors, and \"sound-a-like\" substitutions may occur due to limitations of the software.  Read the note carefully and apply context when erroneous substitutions have occurred. Thank you.        Marce Tinajero DO  05/18/24 2104    "

## 2024-05-18 NOTE — PROGRESS NOTES
"Patient comes in because \"My mental health is declining. I feel numb.\"  States she started Prozac 3 months ago for increased anxiety and \"feeling hyper aware.\" She states  she felt like it helped for \"awhile but now I just feel numb.\" She states she has spent $3000 on online shopping in the last week \"just to have something to look forward to\". She also states she has been shoplifting \"Just to feel something.\"  She denies SI but states  \"I think it would be easier if I just wasn't alive but I'm not going to do anything about those thoughts.\"  She admits to one prior SI attempt 8 years ago by Overdose on pills. She denies AH/VH.   "

## 2024-05-18 NOTE — LETTER
May 18, 2024      To Whom It May Concern:      Imelda Rodarte was seen in our Emergency Department today, 05/18/24.  I expect her condition to improve over the next 3 days.  She may return to work when improved.    Sincerely,      Marce Tinajero, DO on 5/18/2024 at 6:03 PM

## 2024-05-18 NOTE — CONSULTS
Diagnostic Evaluation Consultation  Crisis Assessment    Patient Name: Imelda Rodarte  Age:  18 year old  Legal Sex: female  Gender Identity: female  Pronouns: she/her/hers  Race: White  Ethnicity: Not  or   Language: English      Patient was assessed: In person      Patient location: [facility] EMERGENCY DEPT                             [facility]    Referral Data and Chief Complaint  Imelda Rodarte presents to the ED with family/friends (Pt arrived with her mom, Carolee Rodarte. Pt signed CLARA.). Patient is presenting to the ED for the following concerns: Suicidal ideation, Depression.   Factors that make the mental health crisis life threatening or complex are:  Patient arrived to the emergency department with worsening depression over the last few days. Patient says she's lost interest in things she typically enjoys, decreased appetite, and thoughts that life would be better if she weren't around. 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 Patient denies homicidal ideation, auditory and visual hallucinations. Patient is interested in seeing the medical provider at [facility]..      Informed Consent and Assessment Methods  Explained the crisis assessment process, including applicable information disclosures and limits to confidentiality, assessed understanding of the process, and obtained consent to proceed with the assessment.  Assessment methods included conducting a formal interview with patient, review of medical records, collaboration with medical staff, and obtaining relevant collateral information from family and community providers when available.  : done     Patient response to interventions: eager to participate, acceptance expressed, verbalizes understanding  Coping skills were attempted to reduce the crisis:  Patient is help seeking and able to safety plan.     History of the Crisis    Patient is a 18 year old female with a history of ADHD and anxiety. Patient arrived to the emergency department with her mom due to worsening depression symptoms the past few weeks. Patient states she does not have motivation to go to work, do the things she enjoys, and has lost 15lbs in the past 2-3 months due to decreased appetite. Patient also endorses passive suicidal ideation and trouble falling asleep at night. Patient decided to come to the hospital because her loved onces have noticed a change in her behavior. Patient states she sees an outpatient mental health therapist once a week. She says she takes 40mg of prozac for anxiety and 60mg vyvanse daily, she receives medication management from her primary care provider. Patient denies homicidal ideation, auditory and visual hallucinations. Patient is interested in seeing the medical provider at [facility] and would like to establish an outpatient psychiatry provider.    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.)  Source of Income:  salary/wages  Financial Environmental Concerns:  none  Current Hobbies:  group/social activities, television/movies/videos, reading (Pt struggled naming hobbies and interest, states it constantly changes and she hasn't had motivation to do anything lately.)  Barriers in Personal Life:  lack of motivation, mental health concerns    Significant Clinical History  Current Anxiety Symptoms:  anxious  Current Depression/Trauma:  avoidance, withdrawl/isolation, excessive guilt, thoughts of death/suicide, sadness  Current Somatic Symptoms:  anxious  Current Psychosis/Thought Disturbance:  impulsive, inattentive, hyperactive, agitation, high risk behavior (Pt denies AVH, endorses shopliffting and spending more money the past few weeks.)  Current  "Eating Symptoms:  loss of appetite, recent weight loss (Pt says she lost 15-20lbs in the last 2 months.)  Chemical Use History:  Alcohol: Social (Pt says she drinks on the weekends, socially.)  Last Use:: 24  Benzodiazepines: None (Patient denies drug use.)  Opiates: None (Patient denies drug use.)  Cocaine: None (Patient denies drug use.)  Marijuana: Other (comments) (Patient says THC makes her anxious, does not use.)  Other Use: None  Addictions: Shopping (Pt and mom report an increase in spending and shopping.)   Past diagnosis:  Anxiety Disorder, ADHD  Family history:  Substance Use Disorder (Pt states family has a lot of loss related to substance abuse. uncle  due to complications of alcoholism in .)  Past treatment:     Details of most recent treatment:  Patient sees an outpatient therapist at [facility] in [place], pt receives medication management from her primary care provider.  Other relevant history:          Collateral Information  Is there collateral information: Yes     Collateral information name, relationship, phone number:  Carolee Lundy/mom/ [phone]    What happened today: Pt's momCarolee says pt's roommate called her this morning because they have been worried about the pt's behavior the past week. Pt has not been able to go to work, she also told her mom and roommate that \"life would be easier if i weren't around\", pt told her mom she would never actually kill herself and does not have a plan.     What is different about patient's functioning: Pt's momCarolee says pt has been depressed for the past 2-3 weeks, lacking motivation, does not want to hang out with friends or walk outside, increase in mood swings, more shopping. Pt's roommate, says pt hasn't been eating as much. Mom did not notice that, has not noticed weight loss, and had dinner with pt the other night and she ate a lot.     Concern about alcohol/drug use:  Mom does not have concerns about drugs or " alcohol.     What do you think the patient needs:  Thinks pt may need more therapy and a med adjustment to include antidepressant. Mom feels comfortable with pt discharging with safety plan.    Has patient made comments about wanting to kill themselves/others: no    If d/c is recommended, can they take part in safety/aftercare planning:  yes    Additional collateral information:  Mom does not have concerns about drugs or alcohol. Thinks pt may need more therapy and a med adjustment to include antidepressant. Mom feels comfortable with pt discharging with safety plan.     Risk Assessment  [place] Suicide Severity Rating Scale Full Clinical Version:  Suicidal Ideation  Q6 Suicide Behavior (Lifetime): yes          [place] Suicide Severity Rating Scale Recent:   Suicidal Ideation (Recent)  Q1 Wished to be Dead (Past Month): no  Q2 Suicidal Thoughts (Past Month): no  Q3 Suicidal Thought Method: no  Q4 Suicidal Intent without Specific Plan: no  Q5 Suicide Intent with Specific Plan: no  Within the Past 3 Months?: no  Level of Risk per Screen: moderate risk          Environmental or Psychosocial Events: challenging interpersonal relationships, impulsivity/recklessness, excessive debt, poor finances  Protective Factors: Protective Factors: strong bond to family unit, community support, or employment, lives in a responsibly safe and stable environment, able to access care without barriers, supportive ongoing medical and mental health care relationships, help seeking, cultural, spiritual , or Oriental orthodox beliefs associated with meaning and value in life, optimistic outlook - identification of future goals    Does the patient have thoughts of harming others? Feels Like Hurting Others: no  Previous Attempt to Hurt Others: no  Is the patient engaging in sexually inappropriate behavior?: no    Is the patient engaging in sexually inappropriate behavior?  no        Mental Status Exam   Affect: Labile, Appropriate  Appearance:  Appropriate  Attention Span/Concentration: Attentive  Eye Contact: Variable, Engaged    Fund of Knowledge: Appropriate   Language /Speech Content: Fluent, Expressive Speech  Language /Speech Volume: Normal  Language /Speech Rate/Productions: Hyperverbal, Normal  Recent Memory: Intact  Remote Memory: Intact  Mood: Anxious, Depressed  Orientation to Person: Yes   Orientation to Place: Yes  Orientation to Time of Day: Yes  Orientation to Date: Yes     Situation (Do they understand why they are here?): Yes  Psychomotor Behavior: Hyperactive, Normal  Thought Content: Clear, Suicidal (Pt endorses passive SI with no plan.)  Thought Form: Flight of Ideas, Goal Directed, Intact            Medication  Psychotropic medications:   Medication Orders - Psychiatric (From admission, onward)      Start     Dose/Rate Route Frequency Ordered Stop    05/18/24 0000  FLUoxetine (PROZAC) 20 MG capsule        Note to Pharmacy: Dose decrease    20 mg Oral DAILY 05/18/24 1759      05/18/24 0000  traZODone (DESYREL) 50 MG tablet         25-50 mg Oral AT BEDTIME PRN 05/18/24 1814               Current Care Team  Patient Care Team:  Senait Lance APRN CNP as PCP - General (Nurse Practitioner Primary Care)  Senait Lance APRN CNP as Assigned PCP    Diagnosis  Patient Active Problem List   Diagnosis Code    Attention deficit hyperactivity disorder (ADHD), unspecified ADHD type F90.9    Controlled substance agreement signed Z79.899    Anxiety F41.9       Primary Problem This Admission  Anxiety F41.9    Clinical Summary and Substantiation of Recommendations   Patient is a 18 year old female with a history of ADHD and anxiety. Patient arrived to the emergency department with her mom due to worsening depression symptoms the past few weeks. Patient states she does not have motivation to go to work, do the things she enjoys, and has lost 15lbs in the past 2-3 months due to decreased appetite. Patient also endorses passive suicidal ideation and  trouble falling asleep at night. Patient decided to come to the hospital because her loved onces have noticed a change in her behavior. Patient states she sees an outpatient mental health therapist once a week. She says she takes 40mg of prozac for anxiety and 60mg vyvanse daily, she receives medication management from her primary care provider. Patient denies homicidal ideation, auditory and visual hallucinations. Patient was able to meet with the medical provider for medication adjustments and create a safety plan with assesor to discharge home to the community. Patient will attend an initial medication management appointment with a new provider on 5/23, patient will also follow up with her established primary care provider and outpatient mental health therapist. Patient appears to be appropriate for outpatient, community levels of care and does not need to remain in the emergency department or hospitalized.                          Patient coping skills attempted to reduce the crisis:  Patient is help seeking and able to safety plan.    Disposition  Recommended disposition: Other. please comment, Medication Management, Individual Therapy        Reviewed case and recommendations with attending provider. Attending Name: Yes, Dr. Tinajero. Pt will discharge from [facility] with safety plan, medication adjustments, and a follow up appointment with psychiatry on 5/23.       Attending concurs with disposition: yes       Patient and/or validated legal guardian concurs with disposition:   yes       Final disposition:  discharge    Legal status on admission: Voluntary/Patient has signed consent for treatment    Assessment Details   Total duration spent with the patient: 35 min     CPT code(s) utilized: 55301 - Psychotherapy for Crisis - 60 (30-74*) min    MIKAYLA Harvey, Psychotherapist  [facility] - Triage & Transition Services  Callback: [phone]

## 2024-05-18 NOTE — ED NOTES
[facility]  ED to [facility] Checklist:      Goal for [facility]: Depression management, Medication management,  Referral and resources, and Time to stabilize    Current Behavior: Cooperative    Safety Concerns: Suicidal, no plan. Pt reports feelings of wanting to be dead rather than actually killing herself.    Legal Hold Status: Voluntary    Medically Cleared by ED provider: Yes    Patient Therapeutically Searched: Not searched - Currently in triage    Belongings: Remain with patient    Independent Ambulation at Baseline: Yes/No: Yes    Participates in Care/Conversation: Yes/No: Yes    Patient Informed about [facility]: Yes/No: Yes    DEC: Ordered and pending    Patient Ready to be Transferred to [facility]? Yes/No: Yes

## 2024-05-18 NOTE — ED PROVIDER NOTES
"  Emergency Department Note      History of Present Illness     Chief Complaint  Depression    HPI  Imelda Rodarte is a 18 year old female who presents today with her mother for depression.  She states that over the last few days she has not wanted to work, states that she does not enjoy normal things she typically enjoys.  States that she has had overall decrease in appetite.  She reports no suicidal ideations.  She has seen a therapist and saw them earlier this week.  She states that she has had thoughts of not wanting to be alive.  No suicidal plans.  She reports difficulty sleeping and staying asleep.  Last menstrual cycle was 3 weeks ago.  Recently restarted OCPs.    Independent Historian  None    Review of External Notes  Prior PCP visits have a diagnosis of DEMETRIO.  Patient on fluoxetine.  Past Medical History   Medical History and Problem List  Past Medical History:   Diagnosis Date    ADHD        Medications  adapalene (DIFFERIN) 0.1 % external cream  FLUoxetine (PROZAC) 40 MG capsule  lisdexamfetamine (VYVANSE) 60 MG capsule  norethindrone-ethinyl estradiol (JUNEL FE 1/20) 1-20 MG-MCG tablet        Surgical History   Past Surgical History:   Procedure Laterality Date    CYST REMOVAL      under eye    TONSILLECTOMY       Physical Exam   Patient Vitals for the past 24 hrs:   BP Temp Temp src Pulse Resp SpO2 Height Weight   05/18/24 1518 109/71 98.3  F (36.8  C) Oral 77 20 97 % 1.727 m (5' 8\") 77.1 kg (170 lb)     Physical Exam  Vitals reviewed.   Constitutional:       General: She is not in acute distress.     Appearance: She is not ill-appearing.   HENT:      Head: Normocephalic and atraumatic.   Eyes:      Extraocular Movements: Extraocular movements intact.   Cardiovascular:      Rate and Rhythm: Normal rate.   Pulmonary:      Effort: Pulmonary effort is normal. No respiratory distress.   Musculoskeletal:      Cervical back: Normal range of motion.   Skin:     General: Skin is warm and dry.   Neurological: "      Mental Status: She is alert and oriented to person, place, and time.      GCS: GCS eye subscore is 4. GCS verbal subscore is 5. GCS motor subscore is 6.   Psychiatric:      Comments: Patient well-appearing.  Reports no suicidal thoughts but has had thoughts of not wanting to be alive.  No hallucinations.         Diagnostics   Lab Results   Labs Ordered and Resulted from Time of ED Arrival to Time of ED Departure - No data to display    Imaging  No orders to display       ED Course    Medications Administered  Medications - No data to display    Procedures  Procedures     Discussion of Management  None    Social Determinants of Health adding to complexity of care  None    ED Course     Medical Decision Making / Diagnosis   CMS Diagnoses: None    MIPS     None    MDM  Imelda Rodarte is a 18 year old female presenting today with depression.  She states that she has had worsening depression over the last few days.  Denies any life events or triggers causing her depression.  She states that she has feelings of hopelessness, has no interest in her normal activities.  Decrease in appetite, insomnia.  Denies any hallucinations.  No suicidal plans.  I discussed plan for [facility].  Patient stable to go to [facility] at this time.    Disposition  The patient was transferred to [facility].     ICD-10 Codes:    ICD-10-CM    1. Depression, unspecified depression type  F32.A            Discharge Medications  New Prescriptions    No medications on file         DO Maury Ware Tiffani, DO  05/18/24 1318

## 2024-05-18 NOTE — PROGRESS NOTES
18 year old female with history of DEMETRIO received from ED due to feeling numb. Reports feeling numb. denies SI/HI. Nursing and risk assessments completed. Assessments reviewed with LMHP and physician. Admission information reviewed with patient. Patient given a tour of [facility] and instructions on using the facility. Questions regarding [facility] addressed. Pt safety search completed.

## 2024-05-18 NOTE — DISCHARGE INSTRUCTIONS
Aftercare Plan    Follow up with established providers and supports as scheduled. Continue taking medications as prescribed. Abstain from drugs and alcohol. Utilize your [facility] crisis team as needed. They are available 24/7. Contact information is listed below.     Treatment Plan from psychiatrist today:  -Discharge home  -Trazodone - start 25-50 MG AT BEDTIME as needed for sleep.   -Vyvanse/lisdexamfetamine - TAKE 3 DAYS OFF FROM THE MEDICATION, then talk with your prescriber about re-starting at a lower dose (ie 30 mg) and do not consider increase until appetite and sleep improve. May need to take more than 3 days off to stabilize current symptoms.   -Prozac/fluoxetine: take one day off then re-start at decreased 20 mg dose. Continue to monitor your symptoms and may need to consider alternative option if still struggling with sleep, appetite, impulsivity.    -Keep scheduled psychiatry appointment.   -Medication education provided this visit including but not limited to: Rationale for medication, importance of medication adherence, medication interactions, common medication side effects, benefits of medications.  -Individual psychotherapy recommended for additional support and ongoing development of nonpharmacologic coping skills and strategies.    -Problem focused supportive therapy and education provided today related to patient's current and acute stressors, symptoms, and diagnoses.     The following appointment(s) and/or referral(s) were made on your behalf. If you need to make changes or cancel please contact the clinic/provider directly:  Date: Thursday, 5/23/2024  Time: 3:00 pm - 4:00 pm  Provider: Emmie Hawkins  MSN  CNP,PMHNP  Location: [address] , [place] [address], [place] [address]  Phone: [phone]  Type: Medication Mgmt - Initial (In-Person)    Patient Instructions:  Before your appointment, you must speak with our Intake Department. Our intake team will  attempt to contact you. If you do not hear from them within 24 hours, please call them at [phone] and tell them you are a [facility] referral. If you do not speak with our Intake Department and complete the necessary paperwork they send you, we cannot see you at your scheduled appointment time.        Remember: give the referrals 3 sessions prior to calling it quits. Do you trust them? Do you feel understood? Do you think they can help? Check in with yourself after each session    If I am feeling unsafe or I am in a crisis, I will:   Contact my established care providers   Call the [place] Suicide Prevention Lifeline: [phone]   Go to the nearest emergency room   Call 301     Warning signs that I or other people might notice when a crisis is developing for me: changes to sleep, appetite or mood, increased anger, agitation or irritability, feeling depressed or hopeless, spending more time alone or talking less, increased crying, decreased productivity, seeing or hearing things that aren't there, thoughts of not wanting to live anymore or of actually killing myself, thoughts of hurting others    Things I am able to do on my own to cope or help me feel better: watching a favorite tv show or movie, listening to music I enjoy, going outside and breathing fresh air, going for a walk or exercising, taking a shower or bath, a cold or hot beverage, a healthy snack, drawing/coloring/painting, journaling, singing or dancing, deep breathing     I can try practicing square breathing when I begin to feel anxious - inhale through the nose for the count of 4 and the first line on the square. Exhale through the mouth for the count of 4 for the second line of the square. Repeat to complete the square. Repeat the square as many times as needed.    I can also use my five senses to practice mindfulness and grounding. What are five things I can see, four things I can hear, three things I can feel, two things I can smell, and one  thing I can taste.     Things that I am able to do with others to cope or help me feel better: sometimes just talking or spending time with someone else, sharing a meal or having coffee, watching a movie or playing a game, going for a walk or exercising    I can also use community resources including mental health hotlines, [facility] crisis teams, or apps.     Things I can use or do for distraction: movies/tv, music, reading, games, drawing/coloring/painting or other art, essential oils, exercise, cleaning/organizing, puzzles, crossword puzzles, word search, Sudoku       I can also download a meditation or relaxation vladimir, like Calm, Headspace, or Insight Timer (all three offer a free version)    Changes I can make to support my mental health and wellness: Attend scheduled mental health therapy and psychiatric appointments. Take my medications as prescribed. Maintain a daily schedule/routine. Abstain from all mood altering substances, including drugs, alcohol, or medications not currently prescribed to me. Implement a self-care routine.      People in my life that I can ask for help: friends or family, trusted teachers/staff/colleagues, trusted members of my community or place of Mormon, mental health crisis lines, or 911    Your [facility] has a mental health crisis team you can call 24/7: [facility], [phone]    Other things that are important when I m in crisis: to remember that the feelings I am having right now are temporary, and it won't feel like this forever, and that it is okay and important to ask for help    Crisis Lines  Crisis Text Line  Text 295216  You will be connected with a trained live crisis counselor to provide support.    Por espanol, texto  AUGUSTIN a 991779 o texto a 442-AYUDAME en WhatsApp    National Hope Line  1.800.SUICIDE [1326199]      Community Resources  Fast Tracker  Linking people to mental health and substance use disorder resources  [employer] Therapeutics[employer].org     [facility]  "Health Warm Line  Peer to peer support  Monday thru Saturday, 12 pm to 10 pm  012.923.1457 or 9.302.325.6281  Text \"Support\" to 64791    National [place] on Mental Illness (ZITA)  151.359.2313 or 1.888.ZITA.HELPS      Mental Health Apps  My3  https://[employer].Kiwup/    VirtualHopeBox  https://[employer] Ristorante/apps/virtual-hope-box/      What is Mindfulness?   Mindfulness is an ancient eastern practice which is very relevant for our lives today.Mindfulness is a very simple concept. Mindfulness means paying attention in a particular way: on purpose, in the present moment, and non-judgementally.   Mindfulness does not conflict with any beliefs ortraditions, whether Mormon, cultural or scientific. It is simply a practical way to notice thoughts, physical sensations, sights, sounds, smells - anything we might not normally notice. The actual skillsmight be simple, but because it is so different to how our minds normally behave, it takes a lot of practice.   We might go out into the garden and as we look around, we might think \"That grassreally needs cutting, and that vegetable patch looks very untidy\". A young child on the other hand, will call over excitedly, \"Hey - come and look at this ant!\"   Mindfulness can simply be noticing whatwe don't normally notice, because our heads are too busy in the future or in the past - thinking about what we need to do, or going over what we have done.   Mindfulness might simply be described aschoosing and learning to control our focus of attention. Page 2 of 4 .getselfhelp.co.uk/mindfulness.htm www.hayley.darrell Lozano 2009, permission to use for therapy purposes.      In a car, we can sometimes drive for miles on  automatic  , without really being aware of what we are doing. In the same way, we may not be really  present , etsjvh-wb-shomuf, for much of our lives: We canoften be  miles away  without knowing it.   On automatic , we are more likely to have our " " buttons pressed : around us and thoughts, feelings and sensations (of which we may be only dimly aware) can trigger old habits of thinking that are often unhelpful and may lead to worsening mood.   By becoming moreaware of our thoughts, feelings, and body sensations, from moment to moment, we give ourselves the possibility of greater freedom and choice; we do not have to go into the same old  mental ruts  that may have caused problems in the past.     Mindful Activity   If we wash the dishes each evening, we might tend to be  in our heads? as we?re washing up, thinking about what we have to do, what we've done earlier in the day, worrying about future events, or regretful thoughts about the past. Again, ayoung child might see things differently, \"Listen to those bubbles! They're fun!\"   Washing up or another routine activity can become a routine (practice of) mindful activity for us. We might notice thetemperature of the water and how it feels on the skin, the texture of the bubbles on the skin, and yes, we might hear the bubbles as they softly pop. The sounds of the water as we take out and put dishesinto the water. The smoothness of the plates, and the texture of the sponge. Just noticing what we might not normally notice.   A mindful walk brings new pleasures. Walking is something most of us doat some time during the day. We can practice, even if only for a couple of minutes at a time, mindful walking. Rather than be \"in our heads\", we can look around and notice what we see, hear, sense. We mightnotice the sensations in our own body just through the act of walking. Noticing the sensations and movement of our feet, legs, arms, head and body as we take each step. Noticing our breathing. Thoughts willcontinuously intrude, but we can just notice them, and then bring our attention back to our walking.   The more we practice, perhaps the more (initially at least) we will notice those thoughtsintruding, and that's ok. " The only aim of mindful activity is to bring our attention back to the activity continually, noticing those sensations, from outside and within us. Page 3 of 4 www.getselfhelp.co.uk/mindfulness.htm www.hayley.darrell Lozano 2009, permission to use for therapy purposes.     Mindful Breathing   The primary focus in Mindfulness Meditation is the breathing. However, the primary goal is a calm, non-judging awareness, allowing thoughts and feelings to come and go withoutgetting caught up in them. This creates calmness and acceptance.   ? Sit comfortably, with your eyes closed and your spine reasonably straight.   ? Direct your attention to your breathing.   ? When thoughts, emotions, physical feelings or external sounds occur, accept them, giving them the space to come and go without judging or getting involved with them.   ? When you notice that your attention has drifted off and is becoming caught up in thoughts or feelings,simply note that the attention has drifted, and then gently bring the attention back to your breathing.     It's ok and natural for thoughts to arise, and for your attention to follow them. No matterhow many times this happens, just keep bringing your attention back to your breathing.     Breathing Meditation 1 (Adeline 1996)   Assume a comfortable posture lying on your back or sitting. If you are sitting, keep the spine straight and let your shoulders drop.   Close your eyes if itfeels comfortable.   Bring your attention to your belly, feeling it rise or expand gently on the in-breath and fall or recede on the out-breath.   Keep your focus on the breathing,  being with? each in-breath for its full duration and with each out-breath for its full duration, as if you were riding the waves of your own breathing.   Every timeyou notice that your mind has wandered off the breath, notice what it was that took you away and then gently bring your attention back to your belly and the feeling of the breath  "coming in and out.   If your mind wanders away from the breath a thousand times, then your job is simply to bring it back to the breath every time, no matter what it becomes preoccupied with.   Practice this exercisefor fifteen minutes at a convenient time every day, whether you feel like it or not, for one week and see how it feels to incorporate a disciplined meditation practice into your life. Be aware of how itfeels to spend some time each day just being with your breath without having to do anything.Page 4 of 4 www.BroadClip.co.uk/mindfulness.htm www.[employer].CardLab   Piper Lozano 2009, permission to use fortherapy purposes.     Breathing Meditation 2 (Adeline 1996)   ? Tune into your breathing at different times during the day, feeling the belly go through one or two risings and fallings.   ? Become aware of your thoughts andfeelings at these moments, just observing them without judging them or yourself.   ? At the same time, be aware of any changes in the way you are seeing things and feeling about yourself.     Using mindfulness to cope with negative experiences (thoughts, feelings, events)   As we become morepractised at using mindfulness for breathing, body sensations and routine daily activities, so we can then learn to be mindful of our thoughts and feelings, to become observers, and then more accepting ofthem. This results in less distressing feelings, and increases our ability to enjoy our lives.   With mindfulness, even the most disturbing sensations, feelings, thoughts, and experiences, can be viewedfrom a wider perspective as passing events in the mind, rather than as \"us\", or as being necessarily true. (Heraclio 2003)   When we are more practiced in using mindfulness, we can use it even in of intense distress, by becoming mindful of the actual experience as an observer, using mindful breathing and focussing our attention on the breathing, listening to the distressing thoughts mindfully,recognising them " "as merely thoughts, breathing with them, allowing them to happen without believing them or arguing with them. If thoughts are too strong or loud, then we can move our attention to ourbreath, the body, or to sounds around us.   Ilan Lr uses the example of waves to help explain mindfulness.   Think of your mind as the surface of a lake or an ocean. There are always waveson the water, sometimes big, sometimes small, sometimes almost imperceptible. The water's waves are churned up by winds, which come and go and vary in direction and intensity, just as do the winds of stressand change in our lives, which stir up waves in our mind. It's possible to find shelter from much of the wind that agitates the mind. Whatever we might do to prevent them, the winds of life and of the mind blow.   \"You can't stop the waves, but you can learn to surf\" (Adeline 2004     Additional Information  Today you were seen by a licensed mental health professional through Triage and Transition services, [facility] ([facility])  for a crisis assessment in the Emergency Department at [facility].  It is recommended that you follow up with your established providers (psychiatrist, mental health therapist, and/or primary care doctor - as relevant) as soon as possible. Coordinators from [facility] will be calling you in the next 24-48 hours to ensure that you have the resources you need.  You can also contact [facility] coordinators directly at [phone]. You may have been scheduled for or offered an appointment with a mental health provider. [facility] maintains an extensive network of licensed behavioral health providers to connect patients with the services they need.  We do not charge providers a fee to participate in our referral network.  We match patients with providers based on a patient's specific needs, insurance coverage, and location.  Our first effort will be to refer you to a provider within your care system, and will utilize " providers outside your care system as needed.

## 2024-05-19 NOTE — PROGRESS NOTES
Patient agrees to discharge plan. Discharge instructions reviewed with patient including follow-up care plan. Medications: sent to home pharmacy . Reviewed safety plan and outpatient resources. Denies SI and HI. All belongings that were brought into the hospital have been returned to patient. Escorted off the unit at 1830 accompanied by [facility] staff. Discharged to home via car.

## 2024-06-05 DIAGNOSIS — F90.2 ATTENTION DEFICIT HYPERACTIVITY DISORDER (ADHD), COMBINED TYPE: Primary | ICD-10-CM

## 2024-06-05 RX ORDER — DEXTROAMPHETAMINE SACCHARATE, AMPHETAMINE ASPARTATE MONOHYDRATE, DEXTROAMPHETAMINE SULFATE AND AMPHETAMINE SULFATE 7.5; 7.5; 7.5; 7.5 MG/1; MG/1; MG/1; MG/1
30 CAPSULE, EXTENDED RELEASE ORAL DAILY
Qty: 30 CAPSULE | Refills: 0 | Status: SHIPPED | OUTPATIENT
Start: 2024-06-05 | End: 2024-07-05

## 2024-06-05 RX ORDER — DEXTROAMPHETAMINE SACCHARATE, AMPHETAMINE ASPARTATE MONOHYDRATE, DEXTROAMPHETAMINE SULFATE AND AMPHETAMINE SULFATE 7.5; 7.5; 7.5; 7.5 MG/1; MG/1; MG/1; MG/1
30 CAPSULE, EXTENDED RELEASE ORAL DAILY
Qty: 30 CAPSULE | Refills: 0 | Status: SHIPPED | OUTPATIENT
Start: 2024-07-05 | End: 2024-08-04

## 2024-06-05 RX ORDER — DEXTROAMPHETAMINE SACCHARATE, AMPHETAMINE ASPARTATE MONOHYDRATE, DEXTROAMPHETAMINE SULFATE AND AMPHETAMINE SULFATE 7.5; 7.5; 7.5; 7.5 MG/1; MG/1; MG/1; MG/1
30 CAPSULE, EXTENDED RELEASE ORAL DAILY
Qty: 30 CAPSULE | Refills: 0 | Status: SHIPPED | OUTPATIENT
Start: 2024-08-04 | End: 2024-09-03

## 2024-06-06 DIAGNOSIS — Z30.41 ENCOUNTER FOR SURVEILLANCE OF CONTRACEPTIVE PILLS: ICD-10-CM

## 2024-06-06 RX ORDER — NORETHINDRONE ACETATE AND ETHINYL ESTRADIOL 1MG-20(21)
1 KIT ORAL DAILY
Qty: 84 TABLET | Refills: 4 | Status: SHIPPED | OUTPATIENT
Start: 2024-06-06

## 2024-06-06 NOTE — TELEPHONE ENCOUNTER
Med: aurovela    LOV (related): 1/29/24      Due for F/U around: 1 year for CPX      Next Appt: No current future appointments scheduled

## 2024-06-11 ENCOUNTER — MYC REFILL (OUTPATIENT)
Dept: FAMILY MEDICINE | Facility: CLINIC | Age: 19
End: 2024-06-11

## 2024-06-12 RX ORDER — TRAZODONE HYDROCHLORIDE 50 MG/1
25-50 TABLET, FILM COATED ORAL
Qty: 30 TABLET | Refills: 0 | OUTPATIENT
Start: 2024-06-12

## 2024-06-12 NOTE — TELEPHONE ENCOUNTER
Patient started on trazodone in ED 5/18/24. Refill routed to Senait Lance CNP for review. Laisha Mullen

## 2024-06-17 ENCOUNTER — OFFICE VISIT (OUTPATIENT)
Dept: FAMILY MEDICINE | Facility: CLINIC | Age: 19
End: 2024-06-17

## 2024-06-17 VITALS
HEART RATE: 66 BPM | DIASTOLIC BLOOD PRESSURE: 54 MMHG | WEIGHT: 169 LBS | SYSTOLIC BLOOD PRESSURE: 98 MMHG | BODY MASS INDEX: 26.47 KG/M2 | OXYGEN SATURATION: 100 %

## 2024-06-17 DIAGNOSIS — G47.00 INSOMNIA, UNSPECIFIED TYPE: ICD-10-CM

## 2024-06-17 DIAGNOSIS — F31.81 BIPOLAR II DISORDER (H): ICD-10-CM

## 2024-06-17 DIAGNOSIS — F90.9 ATTENTION DEFICIT HYPERACTIVITY DISORDER (ADHD), UNSPECIFIED ADHD TYPE: Primary | ICD-10-CM

## 2024-06-17 PROCEDURE — 99214 OFFICE O/P EST MOD 30 MIN: CPT

## 2024-06-17 RX ORDER — TRAZODONE HYDROCHLORIDE 50 MG/1
50-100 TABLET, FILM COATED ORAL
Qty: 30 TABLET | Refills: 0 | Status: SHIPPED | OUTPATIENT
Start: 2024-06-17 | End: 2024-06-24

## 2024-06-17 RX ORDER — DEXTROAMPHETAMINE SACCHARATE, AMPHETAMINE ASPARTATE, DEXTROAMPHETAMINE SULFATE AND AMPHETAMINE SULFATE 1.25; 1.25; 1.25; 1.25 MG/1; MG/1; MG/1; MG/1
5 TABLET ORAL DAILY
Qty: 30 TABLET | Refills: 0 | Status: SHIPPED | OUTPATIENT
Start: 2024-08-16 | End: 2024-09-15

## 2024-06-17 RX ORDER — DEXTROAMPHETAMINE SACCHARATE, AMPHETAMINE ASPARTATE, DEXTROAMPHETAMINE SULFATE AND AMPHETAMINE SULFATE 1.25; 1.25; 1.25; 1.25 MG/1; MG/1; MG/1; MG/1
5 TABLET ORAL DAILY
Qty: 30 TABLET | Refills: 0 | Status: SHIPPED | OUTPATIENT
Start: 2024-07-17 | End: 2024-08-16

## 2024-06-17 RX ORDER — LAMOTRIGINE 25 MG/1
25 TABLET ORAL 2 TIMES DAILY
COMMUNITY
Start: 2024-06-12

## 2024-06-17 RX ORDER — DEXTROAMPHETAMINE SACCHARATE, AMPHETAMINE ASPARTATE, DEXTROAMPHETAMINE SULFATE AND AMPHETAMINE SULFATE 1.25; 1.25; 1.25; 1.25 MG/1; MG/1; MG/1; MG/1
5 TABLET ORAL DAILY
Qty: 30 TABLET | Refills: 0 | Status: SHIPPED | OUTPATIENT
Start: 2024-06-17 | End: 2024-07-17

## 2024-06-17 NOTE — PROGRESS NOTES
"  Assessment & Plan     Attention deficit hyperactivity disorder (ADHD), unspecified ADHD type  - can trial adderall 5mg prn in the afternoon - discussed this could further affect sleep and getting to sleep so if she finds this is worsening she should discontinue the afternoon dose of 5mg IR     Bipolar II disorder (H)  - continue follow up with psych    Insomnia, unspecified type  - we can trial dose increase to 50-100mg trazodone, but feel doxepin has better research in regards to helping people sleep - I would prefer she discuss this with her psychiatrist as I do not want to interfere with current medication regimen and planning  - Patient verbalized understanding and is agreeable to the discussed plan of care.    - traZODone (DESYREL) 50 MG tablet  Dispense: 30 tablet; Refill: 0      See Patient Instructions    Return in about 3 months (around 9/17/2024) for Follow up.    Mahsa Segura is a 18 year old, presenting for the following health issues:  Med Check    HPI     [facility] in [place] - psychiatrist management for new diagnosis of bipolar type II - put on mood stabilizer - feels this contributed to patient's [facility] admission. Denies SI today.     Patient feels better on the adderall 30mg XR daily - would like prn afternoon low dose IR to take as she can feel the XR wear off.     Feels trazodone is not really helping her sleep - difficulty falling asleep - \"cannot turn brain off\"       Review of Systems  Constitutional, HEENT, cardiovascular, pulmonary, gi and gu systems are negative, except as otherwise noted.      Objective    BP 98/54   Pulse 66   Wt 76.7 kg (169 lb)   SpO2 100%   BMI 26.47 kg/m    Body mass index is 26.47 kg/m .  Physical Exam   GENERAL: alert and no distress  EYES: Eyes grossly normal to inspection, PERRL and conjunctivae and sclerae normal  RESP: lungs clear to auscultation - no rales, rhonchi or wheezes  CV: regular rate and rhythm, normal S1 S2, no S3 or S4, no murmur, " click or rub, no peripheral edema    No results found for this or any previous visit (from the past 24 hour(s)).        Signed Electronically by: DERREK Ruiz CNP

## 2024-06-24 DIAGNOSIS — G47.00 INSOMNIA, UNSPECIFIED TYPE: ICD-10-CM

## 2024-06-24 RX ORDER — TRAZODONE HYDROCHLORIDE 50 MG/1
TABLET, FILM COATED ORAL
Qty: 180 TABLET | Refills: 0 | Status: SHIPPED | OUTPATIENT
Start: 2024-06-24 | End: 2024-10-02

## 2024-06-24 NOTE — CONFIDENTIAL NOTE
Med: TRAZODONE  REQUESTING 90 DAY SUPPLY    LOV (related): 6/17/24      Due for F/U around: 9/2024 FOR F/U    Next Appt: NONE

## 2024-07-02 ENCOUNTER — MYC REFILL (OUTPATIENT)
Dept: FAMILY MEDICINE | Facility: CLINIC | Age: 19
End: 2024-07-02

## 2024-07-02 RX ORDER — LAMOTRIGINE 25 MG/1
25 TABLET ORAL 2 TIMES DAILY
Status: CANCELLED | OUTPATIENT
Start: 2024-07-02

## 2024-09-27 DIAGNOSIS — G47.00 INSOMNIA, UNSPECIFIED TYPE: ICD-10-CM

## 2024-09-30 NOTE — TELEPHONE ENCOUNTER
Med: trazodone    LOV (related): 6/17/24      Due for F/U around: 3 months ,   Last CPX 1/2024     Next Appt: No current future appointments scheduled at [facility]

## 2024-10-02 RX ORDER — TRAZODONE HYDROCHLORIDE 50 MG/1
TABLET, FILM COATED ORAL
Qty: 180 TABLET | Refills: 0 | Status: SHIPPED | OUTPATIENT
Start: 2024-10-02

## 2024-10-28 ENCOUNTER — OFFICE VISIT (OUTPATIENT)
Dept: FAMILY MEDICINE | Facility: CLINIC | Age: 19
End: 2024-10-28

## 2024-10-28 VITALS
HEART RATE: 107 BPM | WEIGHT: 170 LBS | OXYGEN SATURATION: 98 % | BODY MASS INDEX: 26.63 KG/M2 | SYSTOLIC BLOOD PRESSURE: 132 MMHG | DIASTOLIC BLOOD PRESSURE: 75 MMHG

## 2024-10-28 DIAGNOSIS — F41.1 GAD (GENERALIZED ANXIETY DISORDER): ICD-10-CM

## 2024-10-28 DIAGNOSIS — F31.81 BIPOLAR II DISORDER (H): ICD-10-CM

## 2024-10-28 DIAGNOSIS — R25.3 MUSCLE TWITCHING: Primary | ICD-10-CM

## 2024-10-28 DIAGNOSIS — F90.2 ATTENTION DEFICIT HYPERACTIVITY DISORDER (ADHD), COMBINED TYPE: ICD-10-CM

## 2024-10-28 PROCEDURE — G2211 COMPLEX E/M VISIT ADD ON: HCPCS

## 2024-10-28 PROCEDURE — 99213 OFFICE O/P EST LOW 20 MIN: CPT

## 2024-10-28 RX ORDER — DEXTROAMPHETAMINE SACCHARATE, AMPHETAMINE ASPARTATE MONOHYDRATE, DEXTROAMPHETAMINE SULFATE AND AMPHETAMINE SULFATE 5; 5; 5; 5 MG/1; MG/1; MG/1; MG/1
30 CAPSULE, EXTENDED RELEASE ORAL DAILY
COMMUNITY
Start: 2024-05-30

## 2024-10-28 NOTE — PROGRESS NOTES
Assessment & Plan     Muscle twitching  - neuro exam stable today, I want her to get her medications stabilized with psychiatry who she has not been into see in awhile. Once stabilized with them and if still having twitches she should have neurological involvement    Attention deficit hyperactivity disorder (ADHD), combined type  - I want psychiatry to take over ADHD given bipolar diagnosis     DEMETRIO (generalized anxiety disorder)  - I feel this is unstable as patient is not sleeping well due to ruminating thoughts after stopping her psych meds last month    Bipolar II disorder (H)  Schedule follow up with psych       I will consult psych on what if anything to do in the  meantime and inform them I would like them to take over ADHD care with mood disorder diagnosis.     See Patient Instructions    No follow-ups on file.    Mahsa Segura is a 19 year old, presenting for the following health issues:  Tremors (Tremor like shakes throughout body. Occurs daily and becomes more frequent later in the day. Ongoing for about a week./Has been trying to get more sleep, but has not noticed a differences)    HPI     1.) full body shaking: noticed about 1 week ago while watching TV starts in her toes and goes up to her head. Between the morning [id number] has maybe 2-3 twitching episodes that last seconds then it is 2 every 30 minutes and at bed time she notices around 10 minutes.   Patient stopped taking her prozac and lamictal medication changes.  Did 1 week taper off of lamictal - took 1 pill per day for one week and then stopped and stopped fluoxetine at the same time.  Only taking adderall 30mg 24 hours.  She was on fluoxetine, trazodone, adderall, and lamictal - does follow with [facility] in [place] for these medications.  Patient reports she is able to sleep but stays awake more so due to anxiety and recurrent thoughts.  Denies recent illness or travel, fever, body aches, or chills.   No worsening  headaches or vision changes. No family history of neurological issues.     She has noticed this in the past when she has been sick with body aches and chills.  Noticed worsening back pain.  Has had 1 concussion in her life about 5-6 years ago - ceiling tile fell on her head.  Comes on at random.        Review of Systems  Constitutional, HEENT, cardiovascular, pulmonary, gi and gu systems are negative, except as otherwise noted.      Objective    /75   Pulse 107   Wt 77.1 kg (170 lb)   SpO2 98%   BMI 26.63 kg/m    Body mass index is 26.63 kg/m .  Physical Exam   GENERAL: alert and no distress  RESP: lungs clear to auscultation - no rales, rhonchi or wheezes  CV: regular rate and rhythm, normal S1 S2, no S3 or S4, no murmur, click or rub, no peripheral edema  MS:  no gross musculoskeletal defects noted, no edema  SKIN: no suspicious lesions or rashes  PSYCH: mentation appears normal, affect normal/bright    No results found for this or any previous visit (from the past 24 hours).        Signed Electronically by: DERREK Ruiz CNP    The longitudinal plan of care for the diagnosis(es)/condition(s) as documented were addressed during this visit. Due to the added complexity in care, I will continue to support Imelda in the subsequent management and with ongoing continuity of care.

## 2024-10-30 ENCOUNTER — MYC MEDICAL ADVICE (OUTPATIENT)
Dept: FAMILY MEDICINE | Facility: CLINIC | Age: 19
End: 2024-10-30

## 2024-10-30 DIAGNOSIS — M54.50 ACUTE BILATERAL LOW BACK PAIN WITHOUT SCIATICA: Primary | ICD-10-CM

## 2024-10-31 NOTE — TELEPHONE ENCOUNTER
Has been dealing with back pain - we did discuss briefly at recent visit, no red flag symptoms, will have her work with PT for this and if not improving consider MRI.     DERREK Ruiz CNP on 10/31/2024 at 3:46 PM

## 2024-11-08 ENCOUNTER — MYC MEDICAL ADVICE (OUTPATIENT)
Dept: FAMILY MEDICINE | Facility: CLINIC | Age: 19
End: 2024-11-08

## 2024-11-08 DIAGNOSIS — F90.2 ATTENTION DEFICIT HYPERACTIVITY DISORDER (ADHD), COMBINED TYPE: Primary | ICD-10-CM

## 2024-11-15 RX ORDER — DEXTROAMPHETAMINE SACCHARATE, AMPHETAMINE ASPARTATE MONOHYDRATE, DEXTROAMPHETAMINE SULFATE AND AMPHETAMINE SULFATE 5; 5; 5; 5 MG/1; MG/1; MG/1; MG/1
20 CAPSULE, EXTENDED RELEASE ORAL DAILY
Qty: 14 CAPSULE | Refills: 0 | Status: SHIPPED | OUTPATIENT
Start: 2024-11-15

## 2025-03-16 ENCOUNTER — HEALTH MAINTENANCE LETTER (OUTPATIENT)
Age: 20
End: 2025-03-16

## 2025-08-12 DIAGNOSIS — Z30.41 ENCOUNTER FOR SURVEILLANCE OF CONTRACEPTIVE PILLS: ICD-10-CM

## 2025-08-13 RX ORDER — NORETHINDRONE ACETATE AND ETHINYL ESTRADIOL 1MG-20(21)
1 KIT ORAL DAILY
Qty: 84 TABLET | Refills: 0 | Status: SHIPPED | OUTPATIENT
Start: 2025-08-13